# Patient Record
Sex: FEMALE | Race: WHITE | Employment: OTHER | URBAN - METROPOLITAN AREA
[De-identification: names, ages, dates, MRNs, and addresses within clinical notes are randomized per-mention and may not be internally consistent; named-entity substitution may affect disease eponyms.]

---

## 2018-09-30 ENCOUNTER — APPOINTMENT (OUTPATIENT)
Dept: CT IMAGING | Age: 78
End: 2018-09-30
Attending: EMERGENCY MEDICINE
Payer: MEDICARE

## 2018-09-30 ENCOUNTER — HOSPITAL ENCOUNTER (EMERGENCY)
Age: 78
Discharge: HOME | End: 2018-09-30
Attending: EMERGENCY MEDICINE | Admitting: EMERGENCY MEDICINE
Payer: MEDICARE

## 2018-09-30 ENCOUNTER — APPOINTMENT (OUTPATIENT)
Dept: GENERAL RADIOLOGY | Age: 78
End: 2018-09-30
Attending: EMERGENCY MEDICINE
Payer: MEDICARE

## 2018-09-30 VITALS
DIASTOLIC BLOOD PRESSURE: 71 MMHG | BODY MASS INDEX: 23.04 KG/M2 | RESPIRATION RATE: 15 BRPM | HEIGHT: 63 IN | TEMPERATURE: 99.6 F | SYSTOLIC BLOOD PRESSURE: 120 MMHG | HEART RATE: 83 BPM | OXYGEN SATURATION: 99 % | WEIGHT: 130 LBS

## 2018-09-30 DIAGNOSIS — R06.02 SOB (SHORTNESS OF BREATH): Primary | ICD-10-CM

## 2018-09-30 DIAGNOSIS — I44.7 LBBB (LEFT BUNDLE BRANCH BLOCK): ICD-10-CM

## 2018-09-30 DIAGNOSIS — K44.9 HIATAL HERNIA: ICD-10-CM

## 2018-09-30 LAB
ALBUMIN SERPL-MCNC: 4.1 G/DL (ref 3.4–5)
ALBUMIN/GLOB SERPL: 1.1 {RATIO} (ref 0.8–1.7)
ALP SERPL-CCNC: 163 U/L (ref 45–117)
ALT SERPL-CCNC: 58 U/L (ref 13–56)
ANION GAP SERPL CALC-SCNC: 12 MMOL/L (ref 3–18)
APPEARANCE UR: CLEAR
AST SERPL-CCNC: 40 U/L (ref 15–37)
BACTERIA URNS QL MICRO: ABNORMAL /HPF
BASOPHILS # BLD: 0 K/UL (ref 0–0.1)
BASOPHILS NFR BLD: 0 % (ref 0–2)
BILIRUB SERPL-MCNC: 0.4 MG/DL (ref 0.2–1)
BILIRUB UR QL: NEGATIVE
BUN SERPL-MCNC: 22 MG/DL (ref 7–18)
BUN/CREAT SERPL: 20 (ref 12–20)
CALCIUM SERPL-MCNC: 9.8 MG/DL (ref 8.5–10.1)
CHLORIDE SERPL-SCNC: 97 MMOL/L (ref 100–108)
CK MB CFR SERPL CALC: 0.9 % (ref 0–4)
CK MB SERPL-MCNC: 1.3 NG/ML (ref 5–25)
CK SERPL-CCNC: 139 U/L (ref 26–192)
CO2 SERPL-SCNC: 26 MMOL/L (ref 21–32)
COLOR UR: YELLOW
CREAT SERPL-MCNC: 1.09 MG/DL (ref 0.6–1.3)
D DIMER PPP FEU-MCNC: 1.51 UG/ML(FEU)
DIFFERENTIAL METHOD BLD: ABNORMAL
EOSINOPHIL # BLD: 0.1 K/UL (ref 0–0.4)
EOSINOPHIL NFR BLD: 1 % (ref 0–5)
EPITH CASTS URNS QL MICRO: ABNORMAL /LPF (ref 0–5)
ERYTHROCYTE [DISTWIDTH] IN BLOOD BY AUTOMATED COUNT: 16.7 % (ref 11.6–14.5)
GLOBULIN SER CALC-MCNC: 3.7 G/DL (ref 2–4)
GLUCOSE SERPL-MCNC: 112 MG/DL (ref 74–99)
GLUCOSE UR STRIP.AUTO-MCNC: NEGATIVE MG/DL
HCT VFR BLD AUTO: 40.8 % (ref 35–45)
HGB BLD-MCNC: 13.4 G/DL (ref 12–16)
HGB UR QL STRIP: NEGATIVE
HYALINE CASTS URNS QL MICRO: ABNORMAL /LPF (ref 0–2)
KETONES UR QL STRIP.AUTO: 15 MG/DL
LEUKOCYTE ESTERASE UR QL STRIP.AUTO: ABNORMAL
LYMPHOCYTES # BLD: 1.1 K/UL (ref 0.9–3.6)
LYMPHOCYTES NFR BLD: 11 % (ref 21–52)
MAGNESIUM SERPL-MCNC: 2.2 MG/DL (ref 1.6–2.6)
MCH RBC QN AUTO: 27.1 PG (ref 24–34)
MCHC RBC AUTO-ENTMCNC: 32.8 G/DL (ref 31–37)
MCV RBC AUTO: 82.6 FL (ref 74–97)
MONOCYTES # BLD: 1.4 K/UL (ref 0.05–1.2)
MONOCYTES NFR BLD: 14 % (ref 3–10)
MUCOUS THREADS URNS QL MICRO: ABNORMAL /LPF
NEUTS SEG # BLD: 7.3 K/UL (ref 1.8–8)
NEUTS SEG NFR BLD: 74 % (ref 40–73)
NITRITE UR QL STRIP.AUTO: NEGATIVE
PH UR STRIP: 6.5 [PH] (ref 5–8)
PLATELET # BLD AUTO: 360 K/UL (ref 135–420)
PMV BLD AUTO: 10.1 FL (ref 9.2–11.8)
POTASSIUM SERPL-SCNC: 3.8 MMOL/L (ref 3.5–5.5)
PROT SERPL-MCNC: 7.8 G/DL (ref 6.4–8.2)
PROT UR STRIP-MCNC: 30 MG/DL
RBC # BLD AUTO: 4.94 M/UL (ref 4.2–5.3)
RBC #/AREA URNS HPF: NEGATIVE /HPF (ref 0–5)
SODIUM SERPL-SCNC: 135 MMOL/L (ref 136–145)
SP GR UR REFRACTOMETRY: 1.02 (ref 1–1.03)
TROPONIN I SERPL-MCNC: <0.02 NG/ML (ref 0–0.06)
UROBILINOGEN UR QL STRIP.AUTO: 1 EU/DL (ref 0.2–1)
WBC # BLD AUTO: 9.9 K/UL (ref 4.6–13.2)
WBC URNS QL MICRO: ABNORMAL /HPF (ref 0–5)

## 2018-09-30 PROCEDURE — 85379 FIBRIN DEGRADATION QUANT: CPT | Performed by: EMERGENCY MEDICINE

## 2018-09-30 PROCEDURE — 96361 HYDRATE IV INFUSION ADD-ON: CPT

## 2018-09-30 PROCEDURE — 74177 CT ABD & PELVIS W/CONTRAST: CPT

## 2018-09-30 PROCEDURE — 82550 ASSAY OF CK (CPK): CPT | Performed by: EMERGENCY MEDICINE

## 2018-09-30 PROCEDURE — 93005 ELECTROCARDIOGRAM TRACING: CPT

## 2018-09-30 PROCEDURE — 81001 URINALYSIS AUTO W/SCOPE: CPT | Performed by: EMERGENCY MEDICINE

## 2018-09-30 PROCEDURE — 71046 X-RAY EXAM CHEST 2 VIEWS: CPT

## 2018-09-30 PROCEDURE — 85025 COMPLETE CBC W/AUTO DIFF WBC: CPT | Performed by: EMERGENCY MEDICINE

## 2018-09-30 PROCEDURE — 99285 EMERGENCY DEPT VISIT HI MDM: CPT

## 2018-09-30 PROCEDURE — 71275 CT ANGIOGRAPHY CHEST: CPT

## 2018-09-30 PROCEDURE — 80053 COMPREHEN METABOLIC PANEL: CPT | Performed by: EMERGENCY MEDICINE

## 2018-09-30 PROCEDURE — 96360 HYDRATION IV INFUSION INIT: CPT

## 2018-09-30 PROCEDURE — 83735 ASSAY OF MAGNESIUM: CPT | Performed by: EMERGENCY MEDICINE

## 2018-09-30 PROCEDURE — 74011636320 HC RX REV CODE- 636/320: Performed by: EMERGENCY MEDICINE

## 2018-09-30 PROCEDURE — 74011250636 HC RX REV CODE- 250/636: Performed by: EMERGENCY MEDICINE

## 2018-09-30 RX ORDER — ONDANSETRON 2 MG/ML
4 INJECTION INTRAMUSCULAR; INTRAVENOUS
Status: DISCONTINUED | OUTPATIENT
Start: 2018-09-30 | End: 2018-10-01 | Stop reason: HOSPADM

## 2018-09-30 RX ORDER — SIMVASTATIN 20 MG/1
20 TABLET, FILM COATED ORAL
COMMUNITY

## 2018-09-30 RX ORDER — CYCLOBENZAPRINE HCL 10 MG
TABLET ORAL
COMMUNITY
End: 2018-10-24

## 2018-09-30 RX ORDER — CLOBETASOL PROPIONATE 0.5 MG/ML
1 LOTION TOPICAL 2 TIMES DAILY
COMMUNITY

## 2018-09-30 RX ORDER — DICLOFENAC SODIUM 75 MG/1
75 TABLET, DELAYED RELEASE ORAL
COMMUNITY
End: 2018-10-31

## 2018-09-30 RX ORDER — LISINOPRIL 5 MG/1
2.5 TABLET ORAL DAILY
COMMUNITY

## 2018-09-30 RX ORDER — ONDANSETRON 4 MG/1
4 TABLET, ORALLY DISINTEGRATING ORAL
Qty: 6 TAB | Refills: 0 | Status: SHIPPED | OUTPATIENT
Start: 2018-09-30

## 2018-09-30 RX ORDER — ZOLEDRONIC ACID 5 MG/100ML
5 INJECTION, SOLUTION INTRAVENOUS ONCE
COMMUNITY

## 2018-09-30 RX ADMIN — SODIUM CHLORIDE 1000 ML: 900 INJECTION, SOLUTION INTRAVENOUS at 17:22

## 2018-09-30 RX ADMIN — IOPAMIDOL 100 ML: 755 INJECTION, SOLUTION INTRAVENOUS at 19:44

## 2018-09-30 NOTE — ED PROVIDER NOTES
EMERGENCY DEPARTMENT HISTORY AND PHYSICAL EXAM    Date: 9/30/2018  Patient Name: Tilden Skiff    History of Presenting Illness     Chief Complaint   Patient presents with    Shortness of Breath    Vomiting    Nausea         History Provided By: Patient and EMS    Chief Complaint: Fatigue  Duration: 2 Days  Timing:  Acute  Location: N/A  Modifying Factors: SOB is exacerbated with exertion. Associated Symptoms: nausea, 2 episodes of vomiting, intermittent SOB, dizziness that has resolved, frequent burping, mild right-sided headache, productive clear cough when standing, abdominal pain that is resolved after burping    Additional History (Context):   4:11 PM  Tilden Skiff is a 70 y.o. female with PMHX of HTN, GERD, and arthritis who presents to the emergency department via EMS C/O fatigue onset 2 days ago. Associated sxs include nausea, 2 episodes of vomiting, intermittent SOB, dizziness that has resolved, frequent burping, mild right-sided headache, productive clear cough when standing, abdominal pain that is resolved after burping. SOB is exacerbated with exertion. Pt was seen at Urgent Care today for same and was sent to this facility after hearing an arrhythmia. Pt had an EKG performed at that time and advised the pt to come to the ED for further evaluation. Pt was seen by her PCP last week due to having back surgery this week. Pt reports she has been previously diagnosed with a LBBB. EMS administered 4 mg of Zofran and reports a blood pressure of 173/97 and O2 stats of 98% on room air. Pt denies chest pain, diarrhea, constipation, hematuria, hematochezia, and any other sxs or complaints.      PCP: JOSE Otoole    Current Facility-Administered Medications   Medication Dose Route Frequency Provider Last Rate Last Dose    ondansetron (ZOFRAN) injection 4 mg  4 mg IntraVENous NOW Paul Ferrari MD         Current Outpatient Prescriptions   Medication Sig Dispense Refill    ondansetron (ZOFRAN ODT) 4 mg disintegrating tablet Take 1 Tab by mouth every eight (8) hours as needed for Nausea. 6 Tab 0    clobetasol (CLOBEX) 0.05 % lotion Apply  to affected area two (2) times a day.  cyclobenzaprine (FLEXERIL) 10 mg tablet Take  by mouth three (3) times daily as needed for Muscle Spasm(s).  diclofenac EC (VOLTAREN) 75 mg EC tablet Take  by mouth.  doxepin HCl (SINEQUAN PO) Take 25 mg by mouth.  dupilumab (DUPIXENT) 300 mg/2 mL syrg syringe 300 mg by SubCUTAneous route.  lisinopril (PRINIVIL, ZESTRIL) 5 mg tablet Take  by mouth daily.  simvastatin (ZOCOR) 20 mg tablet Take  by mouth nightly.  zoledronic acid (RECLAST) 5 mg/100 mL pgbk 5 mg by IntraVENous route once.  Hydrochlorothiazide 12.5 mg tablet Take 12.5 mg by mouth daily.  omeprazole (PRILOSEC) 40 mg capsule Take 40 mg by mouth daily.  Magnesium Oxide 500 mg cap Take 400 mg by mouth daily.  atorvastatin (LIPITOR) 20 mg tablet Take 20 mg by mouth daily.  LORazepam (ATIVAN) 0.5 mg tablet Take 0.5 mg by mouth every eight (8) hours as needed for Anxiety.  Cholecalciferol, Vitamin D3, (VITAMIN D3) 1,000 unit cap Take  by mouth daily.  omega-3 fatty acids-vitamin e (FISH OIL) 1,000 mg cap Take 3 Caps by mouth daily.  hydrOXYzine (ATARAX) 25 mg tablet Take 25 mg by mouth three (3) times daily as needed for Itching.  triamcinolone acetonide (KENALOG) 40 mg/mL injection 40 mg by IntraMUSCular route once.  betamethasone (CELESTONE SOLUSPAN) 6 mg/mL injection 6 mg by IntraMUSCular route once.          Past History     Past Medical History:  Past Medical History:   Diagnosis Date    Acid reflux     Arthritis     Autoimmune disease ([address]     GERD (gastroesophageal reflux disease)     Hypertension 2004    Ill-defined condition     \"lichen plautus per paperwork    Lichen planus     Unspecified adverse effect of anesthesia     itching       Past Surgical History:  Past Surgical History:   Procedure Laterality Date    HX HEENT      sinus surgery    HX ORTHOPAEDIC      right femur fracture pin    HX ORTHOPAEDIC      bone graft right femur    HX ORTHOPAEDIC      right femur pin removal    HX ORTHOPAEDIC      maura bunnion feet surgery    HX ORTHOPAEDIC      right hip replacement    HX ORTHOPAEDIC      right knee replacement    HX ORTHOPAEDIC      carpal tunnel left    HX ORTHOPAEDIC      cervical surgery       Family History:  History reviewed. No pertinent family history. Social History:  Social History   Substance Use Topics    Smoking status: Never Smoker    Smokeless tobacco: Never Used    Alcohol use No       Allergies: Allergies   Allergen Reactions    Other Food Itching     Sugar makes her itch     Benadrilina [Diphenhydramine Hcl] Other (comments)     \"climbs the wall\"    Phenergan [Promethazine] Other (comments)     \"climbs the wall\"    Rice Itching     Itching and stuffed up          Review of Systems   Review of Systems   Constitutional: Positive for fatigue. Respiratory: Positive for cough (productive clear, when standing) and shortness of breath (intermittent). Cardiovascular: Negative for chest pain. Gastrointestinal: Positive for abdominal pain (resolved after burping), nausea and vomiting (2 episodes). Negative for blood in stool, constipation and diarrhea. (+) Frequent burping   Genitourinary: Negative for hematuria. Neurological: Positive for dizziness (has resolved) and headaches (mild right-sided). All other systems reviewed and are negative. Physical Exam     Vitals:    09/30/18 1715 09/30/18 1802 09/30/18 1845 09/30/18 2017   BP: 116/61 119/57 107/51 110/54   Pulse: 93 83 72 88   Resp: 8 13 15 15   Temp:    99.6 °F (37.6 °C)   SpO2: 100% 99% 98% 100%   Weight:       Height:         Physical Exam   Nursing note and vitals reviewed. Constitutional: Alert.  Well appearing, no acute distress  Head: Normocephalic, Atraumatic  Eyes: Pupils are equal, round, and reactive to light, EOMI  ENT: Moist mucous membranes, oropharynx clear. Neck: Supple, non-tender  Cardiovascular: Regular rate and rhythm, no murmurs, rubs, or gallops  Chest: Normal work of breathing and chest excursion bilaterally. No reproducible chest tenderness. Lungs: Clear to ausculation bilaterally. Abdomen: Soft, non tender, non distended  Back: No evidence of trauma or deformity. No CVA Tenderness.   Extremities: No evidence of trauma or deformity, no LE edema  Skin: Warm and dry  Neuro: Alert and appropriate, facial movement symmetric, normal speech, strength and sensation full and symmetric bilaterally, normal gait, normal coordination  Psychiatric: Normal mood and affect    Diagnostic Study Results     Labs -     Recent Results (from the past 12 hour(s))   URINALYSIS W/ RFLX MICROSCOPIC    Collection Time: 09/30/18  4:30 PM   Result Value Ref Range    Color YELLOW      Appearance CLEAR      Specific gravity 1.019 1.005 - 1.030      pH (UA) 6.5 5.0 - 8.0      Protein 30 (A) NEG mg/dL    Glucose NEGATIVE  NEG mg/dL    Ketone 15 (A) NEG mg/dL    Bilirubin NEGATIVE  NEG      Blood NEGATIVE  NEG      Urobilinogen 1.0 0.2 - 1.0 EU/dL    Nitrites NEGATIVE  NEG      Leukocyte Esterase MODERATE (A) NEG     URINE MICROSCOPIC ONLY    Collection Time: 09/30/18  4:30 PM   Result Value Ref Range    WBC 10 to 15 0 - 5 /hpf    RBC NEGATIVE  0 - 5 /hpf    Epithelial cells FEW 0 - 5 /lpf    Bacteria 1+ (A) NEG /hpf    Mucus 1+ (A) NEG /lpf    Hyaline cast 6 to 10 0 - 2 /lpf   CBC WITH AUTOMATED DIFF    Collection Time: 09/30/18  4:45 PM   Result Value Ref Range    WBC 9.9 4.6 - 13.2 K/uL    RBC 4.94 4.20 - 5.30 M/uL    HGB 13.4 12.0 - 16.0 g/dL    HCT 40.8 35.0 - 45.0 %    MCV 82.6 74.0 - 97.0 FL    MCH 27.1 24.0 - 34.0 PG    MCHC 32.8 31.0 - 37.0 g/dL    RDW 16.7 (H) 11.6 - 14.5 %    PLATELET 478 530 - 722 K/uL    MPV 10.1 9.2 - 11.8 FL    NEUTROPHILS 74 (H) 40 - 73 % LYMPHOCYTES 11 (L) 21 - 52 %    MONOCYTES 14 (H) 3 - 10 %    EOSINOPHILS 1 0 - 5 %    BASOPHILS 0 0 - 2 %    ABS. NEUTROPHILS 7.3 1.8 - 8.0 K/UL    ABS. LYMPHOCYTES 1.1 0.9 - 3.6 K/UL    ABS. MONOCYTES 1.4 (H) 0.05 - 1.2 K/UL    ABS. EOSINOPHILS 0.1 0.0 - 0.4 K/UL    ABS. BASOPHILS 0.0 0.0 - 0.1 K/UL    DF AUTOMATED     EKG, 12 LEAD, INITIAL    Collection Time: 09/30/18  4:45 PM   Result Value Ref Range    Ventricular Rate 89 BPM    Atrial Rate 89 BPM    P-R Interval 162 ms    QRS Duration 142 ms    Q-T Interval 424 ms    QTC Calculation (Bezet) 515 ms    Calculated P Axis 40 degrees    Calculated R Axis 8 degrees    Calculated T Axis 117 degrees    Diagnosis       Normal sinus rhythm  Left bundle branch block  Abnormal ECG  When compared with ECG of 28-FEB-2014 15:01,  Left bundle branch block is now present     D DIMER    Collection Time: 09/30/18  5:05 PM   Result Value Ref Range    D DIMER 1.51 (H) <0.46 ug/ml(FEU)   CARDIAC PANEL,(CK, CKMB & TROPONIN)    Collection Time: 09/30/18  5:05 PM   Result Value Ref Range     26 - 192 U/L    CK - MB 1.3 <3.6 ng/ml    CK-MB Index 0.9 0.0 - 4.0 %    Troponin-I, Qt. <0.02 0.00 - 0.06 NG/ML   MAGNESIUM    Collection Time: 09/30/18  5:05 PM   Result Value Ref Range    Magnesium 2.2 1.6 - 2.6 mg/dL   METABOLIC PANEL, COMPREHENSIVE    Collection Time: 09/30/18  5:05 PM   Result Value Ref Range    Sodium 135 (L) 136 - 145 mmol/L    Potassium 3.8 3.5 - 5.5 mmol/L    Chloride 97 (L) 100 - 108 mmol/L    CO2 26 21 - 32 mmol/L    Anion gap 12 3.0 - 18 mmol/L    Glucose 112 (H) 74 - 99 mg/dL    BUN 22 (H) 7.0 - 18 MG/DL    Creatinine 1.09 0.6 - 1.3 MG/DL    BUN/Creatinine ratio 20 12 - 20      GFR est AA 59 (L) >60 ml/min/1.73m2    GFR est non-AA 49 (L) >60 ml/min/1.73m2    Calcium 9.8 8.5 - 10.1 MG/DL    Bilirubin, total 0.4 0.2 - 1.0 MG/DL    ALT (SGPT) 58 (H) 13 - 56 U/L    AST (SGOT) 40 (H) 15 - 37 U/L    Alk.  phosphatase 163 (H) 45 - 117 U/L    Protein, total 7.8 6.4 - 8.2 g/dL    Albumin 4.1 3.4 - 5.0 g/dL    Globulin 3.7 2.0 - 4.0 g/dL    A-G Ratio 1.1 0.8 - 1.7         Radiologic Studies -   CT ABD PELV W CONT   Final Result      CTA CHEST W OR W WO CONT   Final Result      XR CHEST PA LAT   Final Result        4:46 PM  RADIOLOGY FINDINGS  Chest X-ray shows no acute process. Pending review by Radiologist  Recorded by Mignon Ocasio ED Scribe, as dictated by Joel Pollard MD    CT Results  (Last 48 hours)               09/30/18 2025  CT ABD PELV W CONT Final result    Impression:  IMPRESSION:       1. No evidence of acute solid organ or bowel abnormality. 2. No free fluid or fluid collection. 3. Simple left renal cyst. Additional bilateral probable renal cysts. Strictly   indeterminate 1 cm left renal interpolar hypodensity, perhaps cyst as well. 4. Nonspecific small right hepatic lobe hypodensity too small to characterize. 5. Hiatal hernia. Narrative:  EXAM: CT ABDOMEN AND PELVIS IV ONLY       INDICATION: Abdominal pain       COMPARISON: No prior comparison study       TECHNIQUE: Axial CT imaging of the abdomen and pelvis was performed after the   administration of only IV contrast as per specific clinician request.    Multiplanar reformats were generated. One or more dose reduction techniques   were used on this CT: automated exposure control, adjustment of the mAs and/or   kVp according to patient's size, and iterative reconstruction techniques. The   specific techniques utilized on this CT exam have been documented in the   patient's electronic medical record.       _______________       FINDINGS:       LOWER CHEST: The visualized lung bases are clear. There is no pericardial or   pleural effusion. Mild to moderate sized hiatal hernia is present.        LIVER, BILIARY: Tiny hypodensity is present along the medial aspect of the   posterior segment right hepatic lobe measuring estimated 9 x 3 x 9 mm in size,   too small accurately characterize perhaps cyst. No abnormal biliary dilation. Gallbladder is unremarkable. PANCREAS: Unremarkable. SPLEEN: Unremarkable. ADRENALS: Unremarkable. KIDNEYS: 3 cm hypodensity is present in the left renal interpolar region with   Hounsfield units suggesting simple cyst. Smaller bilateral cortical   hypodensities are present which are too small accurately characterize. 1 cm   hypodensity along the ventral right interpolar region is indeterminate based on   Hounsfield units, 30s. No hydronephrosis is present. There is a small amount of   excreted contrast in the renal collecting systems. LYMPH NODES: No enlarged lymph nodes by size criteria. GASTROINTESTINAL TRACT: The lack of oral contrast limits bowel evaluation. No   abnormal bowel dilation or wall thickening. The stomach is incompletely   distended, otherwise grossly normal. The appendix is not discretely seen. No   pericecal inflammatory changes are present. PELVIC ORGANS: Unremarkable. VASCULATURE: Atherosclerotic calcifications are present. OSSEOUS: Prominent scatter artifact is produced by orthopedic hardware from   prior right total hip arthroplasty. Degenerative changes are seen in the spine. Mild dextroconvex curvature is present with apex at the L2-L3 level. Slight   L4-L5 anterior listhesis is present probably due to facet degeneration at this   level. Irregularity is present along the right iliac wing with small amount of   absent bone, likely from prior bone harvesting although nonspecific. OTHER: None.       _______________           09/30/18 2023  CTA CHEST W OR W WO CONT Final result    Impression:  IMPRESSION:       1. No evidence of pulmonary embolism. 2.  Minimal atelectasis. No consolidation.        Narrative:  EXAM: CTA CHEST       INDICATION: Chest pain, acute, pulmonary embolism (PE) suspected       COMPARISON: CTA chest 5/9/2006       TECHNIQUE: Axial CT imaging from the thoracic inlet through the diaphragm with   intravenous contrast utilizing CTA study for pulmonary artery evaluation. Coronal and sagittal MIP reformations were generated at a separate workstation. One or more dose reduction techniques were used on this CT: automated exposure   control, adjustment of the mAs and/or kVp according to patient's size, and   iterative reconstruction techniques. The specific techniques utilized on this CT   exam have been documented in the patient's electronic medical record.       _______________       FINDINGS:       EXAM QUALITY: Overall exam quality is adequate. Pulmonary arterial enhancement   is adequate. The breath hold is satisfactory. PULMONARY ARTERIES: No convincing evidence of pulmonary embolism. MEDIASTINUM: Atherosclerotic calcifications are present in the thoracic aorta   without aneurysmal dilatation or intimal flap. No pericardial effusion is   present. PLEURA: Unremarkable. LYMPH NODES: No enlarged lymph nodes by size criteria. LUNGS/AIRWAY: Minimal subpleural reticular densities are present posteriorly in   both lungs suggesting atelectasis. No consolidation is present. UPPER ABDOMEN: Unremarkable. OSSEOUS STRUCTURES: Degenerative changes are seen in the spine. OTHER: Hiatal hernia is present.   _______________               CXR Results  (Last 48 hours)               09/30/18 1639  XR CHEST PA LAT Final result    Impression:  IMPRESSION:       1. No acute cardiopulmonary process. Narrative:  PA and Lateral Chest         History:  abdominal pain , shortness of breath        Comparison: PA chest 2/28/2014       PA and lateral views of the chest demonstrate the cardiomediastinal silhouette   is within normal limits. Cardiac monitoring leads are present. There is no focal   consolidation, pleural effusion, or pneumothorax. Degenerative changes are seen   in the spine.  Anterior cervical fusion hardware again seen at the edge of the   field-of-view. Medications given in the ED-  Medications   ondansetron [facility]) injection 4 mg (4 mg IntraVENous Refused 9/30/18 1653)   sodium chloride 0.9 % bolus infusion 1,000 mL (0 mL IntraVENous IV Completed 9/30/18 1933)   iopamidol (ISOVUE-370) 76 % injection  mL (100 mL IntraVENous Given 9/30/18 1944)         Medical Decision Making   I am the first provider for this patient. I reviewed the vital signs, available nursing notes, past medical history, past surgical history, family history and social history. Vital Signs-Reviewed the patient's vital signs. Pulse Oximetry Analysis - 99% on RA     Cardiac Monitor:  Rate: 87 bpm  Rhythm: NSR    EKG interpretation: (EMS)  Rhythm: Sinus tachycardia. Rate: 103 bpm; LBBB (is not new)  EKG read by Ellen Aguero MD at 4:20 PM     EKG interpretation: (Preliminary)  Rhythm: NSR. Rate: 89 bpm; QTc: 515 ms, LBBB. No acute signs of ischemia. EKG read by Ellen Aguero MD at 4:49 PM     Records Reviewed: Nursing Notes and Old Medical Records    Provider Notes (Medical Decision Making): 70 y.o female presenting with vague complaints. She is complaining of intermittent nausea and fatigue. She has vomited twice. She also complains of intermittent shortness of breath but does not feel short of breath now. She was sent here by Urgent Care. Her only complaint currently is nausea and fatigue. She denies chest pain though she has been having frequent episode of belching. At this time concern for cardiac etiology, will get an EKG and cardiac enzymes as well as a CXR. Will treat nausea with Zofran and check basic labs to look for cause. Given that she has not abdominal pain, will not order a CT unless there is something concerning in her labs. Procedures:  Procedures    ED Course:   4:11 PM Initial assessment performed.  The patients presenting problems have been discussed, and they are in agreement with the care plan formulated and outlined with them. I have encouraged them to ask questions as they arise throughout their visit. 4:56 PM Pt declines any nausea medication at this time. 5:55 PM When pt was doing her walking sat, her oxygen went down to 95% and her heart rate increased 115 bpm. D dimer was elevated to 1.51. At this time, concern that her symptoms could be related to a PE. Will obtain a CTA chest. Given her nausea and vomiting, will also do a CT abdomen and pelvis. SIGN OUT:  7:00 PM  Patient's presentation, labs/imaging and plan of care was reviewed with Serge Harper MD as part of sign out. They will await CT results as part of the plan discussed with the patient. Serge Harper MD's assistance in completion of this plan is greatly appreciated but it should be noted that I will be the provider of record for this patient. Mary Walton MD    9:21 PM Assumed care from Mary Walton MD, elderly female presented with some SOB, which is worse with exertion. CT chest abd was pending, patient without complaints on exam, reviewed labs since CT scan, which does not show PE, and the CT abd only shows hiatal hernia, has left BBB, age unknown, last EKG in the ED was 2014, which only had nonspecific ST changes. Abd pain most likely from the hiatal hernia and will refer to cardiology for possible stress test for the dyspnea on exertion and the left BBB.    9:36 PM At discharge pt states that she had an EKG at [facility] for pre op of shoulder surgery last November and states that EKG was normal. She has no CP in ED, she is scheduled for back surgery this Thursday and I suggested that she see Dr. Ryan Ceja soon, possibly tomorrow as she may need a stress test prior to surgery. Diagnosis and Disposition       DISCHARGE NOTE:  9:22 PM  Leslie Dempsey's  results have been reviewed with her. She has been counseled regarding her diagnosis, treatment, and plan.   She verbally conveys understanding and agreement of the signs, symptoms, diagnosis, treatment and prognosis and additionally agrees to follow up as discussed. She also agrees with the care-plan and conveys that all of her questions have been answered. I have also provided discharge instructions for her that include: educational information regarding their diagnosis and treatment, and list of reasons why they would want to return to the ED prior to their follow-up appointment, should her condition change. She has been provided with education for proper emergency department utilization. CLINICAL IMPRESSION:    1. SOB (shortness of breath)    2. LBBB (left bundle branch block)    3. Hiatal hernia        PLAN:  1. D/C Home  2. Current Discharge Medication List      START taking these medications    Details   ondansetron (ZOFRAN ODT) 4 mg disintegrating tablet Take 1 Tab by mouth every eight (8) hours as needed for Nausea. Qty: 6 Tab, Refills: 0           3. Follow-up Information     Follow up With Details Comments Contact Brent Collins MD  For follow up with cardiology [address] [facility]  [address] [address]  [facility] [address] [facility] [address]  [phone]      [facility] EMERGENCY DEPT  As needed, if symptoms worsen [address] Carolyn Suh [id number] [phone]        _______________________________    Attestations: This note is prepared by Charissa Godinez, acting as Scribe for Weston Cabrera MD.    Weston Cabrera MD:  The scribe's documentation has been prepared under my direction and personally reviewed by me in its entirety. I confirm that the note above accurately reflects all work, treatment, procedures, and medical decision making performed by me.     This note is prepared by Tahmina Alvarez, acting as Scribe for Jose Shepard MD.    Jose Shepard MD:  The scribe's documentation has been prepared under my direction and personally reviewed by me in its entirety.   I confirm that the note above accurately reflects all work, treatment, procedures, and medical decision making performed by me.  _______________________________

## 2018-09-30 NOTE — ED NOTES
Ambu patient with continuous pulse ox up and down and back to room. Pulse ox notes patient's O2 sats 95-97% RA. It is to be noted patient with cold-to-touch fingers at this time. HR is noted 110-120s bpm. Highest  bpm. Patient reports SOB upon deep breaths and dizziness during ambulation. No breaks needed during ambulation. Steady gait noted. Valentina Ramon MD observed and made aware.

## 2018-09-30 NOTE — ED TRIAGE NOTES
Pt arrived via EMS. Pt states that she has had increased shortness of breath ain the last week, pt also c/o dizziness and nausea. Pt was brought from [facility].

## 2018-09-30 NOTE — ED NOTES
Assumed care of pt from [address]. Have been informed that pt awaits for CTA of chest/CT of abd- D Dimer came back elevated.

## 2018-10-01 NOTE — DISCHARGE INSTRUCTIONS
Hiatal Hernia: Care Instructions  Your Care Instructions  A hiatal hernia occurs when part of the stomach bulges into the chest cavity. A hiatal hernia may allow stomach acid and juices to back up into the esophagus (acid reflux). This can cause a feeling of burning, warmth, heat, or pain behind the breastbone. This feeling may often occur after you eat, soon after you lie down, or when you bend forward, and it may come and go. You also may have a sour taste in your mouth. These symptoms are commonly known as heartburn or reflux. But not all hiatal hernias cause symptoms. Follow-up care is a key part of your treatment and safety. Be sure to make and go to all appointments, and call your doctor if you are having problems. It's also a good idea to know your test results and keep a list of the medicines you take. How can you care for yourself at home? · Take your medicines exactly as prescribed. Call your doctor if you think you are having a problem with your medicine. · Do not take aspirin or other nonsteroidal anti-inflammatory drugs (NSAIDs), such as ibuprofen (Advil, Motrin) or naproxen (Aleve), unless your doctor says it is okay. Ask your doctor what you can take for pain. · Your doctor may recommend over-the-counter medicine. For mild or occasional indigestion, antacids such as Tums, Gaviscon, Maalox, or Mylanta may help. Your doctor also may recommend over-the-counter acid reducers, such as famotidine (Pepcid AC), cimetidine (Tagamet HB), ranitidine (Zantac 75 and Zantac 150), or omeprazole (Prilosec). Read and follow all instructions on the label. If you use these medicines often, talk with your doctor. · Change your eating habits. ¨ It's best to eat several small meals instead of two or three large meals. ¨ After you eat, wait 2 to 3 hours before you lie down. Late-night snacks aren't a good idea. ¨ Chocolate, mint, and alcohol can make heartburn worse.  They relax the valve between the esophagus and the stomach. ¨ Spicy foods, foods that have a lot of acid (like tomatoes and oranges), and coffee can make heartburn symptoms worse in some people. If your symptoms are worse after you eat a certain food, you may want to stop eating that food to see if your symptoms get better. · Do not smoke or chew tobacco.  · If you get heartburn at night, raise the head of your bed 6 to 8 inches by putting the frame on blocks or placing a foam wedge under the head of your mattress. (Adding extra pillows does not work.)  · Do not wear tight clothing around your middle. · Lose weight if you need to. Losing just 5 to 10 pounds can help. When should you call for help? Call your doctor now or seek immediate medical care if:    · You have new or worse belly pain.     · You are vomiting.    Watch closely for changes in your health, and be sure to contact your doctor if:    · You have new or worse symptoms of indigestion.     · You have trouble or pain swallowing.     · You are losing weight.     · You do not get better as expected. Where can you learn more? Go to http://victor manuel-abby.info/. Enter P857 in the search box to learn more about \"Hiatal Hernia: Care Instructions. \"  Current as of: May 12, 2017  Content Version: 11.7  © 4193-2489 [employer]. Care instructions adapted under license by [employer] (which disclaims liability or warranty for this information). If you have questions about a medical condition or this instruction, always ask your healthcare professional. [place] [address] any warranty or liability for your use of this information.

## 2018-10-01 NOTE — ED NOTES
VSS. Pt mentioning her visit at [facility] and why she is here. Pt hoping test go well, for she isn't wanting to reschedule her back surgery. Pt awaiting CT scan results. No needs/or complaints being voiced by pt. NAD observed.

## 2018-10-17 PROBLEM — M48.062 SPINAL STENOSIS OF LUMBAR REGION WITH NEUROGENIC CLAUDICATION: Status: ACTIVE | Noted: 2018-10-17

## 2018-10-17 PROBLEM — M51.26 HNP (HERNIATED NUCLEUS PULPOSUS), LUMBAR: Status: ACTIVE | Noted: 2018-10-17

## 2018-10-17 PROBLEM — M51.36 DDD (DEGENERATIVE DISC DISEASE), LUMBAR: Status: ACTIVE | Noted: 2018-10-17

## 2018-10-24 ENCOUNTER — HOSPITAL ENCOUNTER (OUTPATIENT)
Dept: PREADMISSION TESTING | Age: 78
Discharge: HOME | End: 2018-10-24
Payer: MEDICARE

## 2018-10-24 VITALS — WEIGHT: 134 LBS | HEIGHT: 62 IN | BODY MASS INDEX: 24.66 KG/M2

## 2018-10-24 PROCEDURE — 87641 MR-STAPH DNA AMP PROBE: CPT | Performed by: ORTHOPAEDIC SURGERY

## 2018-10-24 RX ORDER — METOPROLOL SUCCINATE 25 MG/1
25 TABLET, EXTENDED RELEASE ORAL
COMMUNITY

## 2018-10-24 NOTE — PERIOP NOTES
Pt. Denies personal or family history of problems with anesthesia. Pt is independent. Denies sleep apnea. No CPAP. Cardiac clearance under media tab.

## 2018-10-25 LAB
BACTERIA SPEC CULT: NORMAL
SERVICE CMNT-IMP: NORMAL

## 2018-10-29 ENCOUNTER — ANESTHESIA EVENT (OUTPATIENT)
Dept: SURGERY | Age: 78
DRG: 854 | End: 2018-10-29
Payer: MEDICARE

## 2018-10-29 NOTE — H&P
Patient Name:  Ye Moore  YOB: 1940      Chief Complaint:  Lower back pain and bilateral lower extremity pain. History of Chief Complaint:  Ms. Renuka Klein is a 70-year-old female referred by Darion Nuñez PA-C, who is being seen for lower back pain and bilateral lower extremity pain. The pain is worse in the thighs, and she has severe leg cramps at night. She was scheduled for lumbar surgery with Dr. Nela Olivas, but it was canceled due to her having shortness of breath and dizziness. She was seen at urgent care and had an EKG and now has an upcoming nuclear stress test and echocardiogram. She is awaiting cardiac clearance. Dr. Nela Olivas now has no availability until December, and she was referred to see me by seven people. She has a followup with her cardiologist, Dr. Eulalio Saini, on 10/08/18. Her pain is constant, and she rates it as a 4/10. She has occasional numbness in the left lower extremity. There is no weakness and no bowel or bladder dysfunction. She has problems with poor balance. The pain is worse with walking, standing, and sitting. It is better with lying down. The pain is worse in the evenings. The patient's uncle is a chiropractor, and she has had multiple sessions with him throughout her life. She has had four sessions of physical therapy in September 2018 with no relief. She had an epidural steroid injection done in April 2018 which gave her no relief.  She had an MRI scan done at [facility], and she had a DEXA scan done in 2016 which showed osteopenia, and she has been on Reclast.     Past Medical/Surgical History:    Disease/Disorder Type Date Side Surgery Date Side Comment   Lichen planus          Acid reflux          Hypertension          Elevated lipids          Arthritis              ORIF 1964 right [facility] 10/09/2018 -femur       Pins removed from femur Martha 110 bilateral        Hip replacement 1996 right        Knee replacement 2004 right        Carpal tunnel release 2008 left        Surgery, cervical spine 2008         Sinus surgery 2010         Hip replacement 2014 right        Shoulder surgery 2017       Allergies:    Ingredient Reaction Medication Name Comment   DIPHENHYDRAMINE HCL  Benadryl    PROMETHAZINE HCL  Phenergan      Current Medications:    Medication Directions   Nexium 20 mg capsule,delayed release take 1 capsule by oral route  every day at least 1 hour before a meal swallowing whole. Do not crush or chew granules. lisinopril 2.5 mg tablet take 1 tablet by oral route  every day   hydrochlorothiazide 12.5 mg tablet take 1 tablet by oral route  every day   magnesium 200 mg tablet take 2 tablet by oral route  every day   Zocor 20 mg tablet take 1 tablet by oral route  every day in the evening   diclofenac sodium 75 mg tablet,delayed release take 1 tablet by oral route 2 times every day   Ativan 1 mg tablet take 1 tablet by oral route  every day as needed   Dupixent 300 mg/2 mL subcutaneous syringe inject 2 milliliter by subcutaneous route  every 2 weeks in the abdomen, thigh, or upper arm rotating injection sites     Social History:      SMOKING  Status Tobacco Type Units Per Day Yrs Used   Never smoker        ALCOHOL  There is no history of alcohol use. Family History:    Disease Detail Family Member Age Cause of Death Comments   Cardiovascular disease Father  N    Hypertension Father  N    Arthritis Mother  N    Hypertension Mother  N    Diabetes mellitus Sister  N      Review of Systems:    Pertinent positives include heart murmur, ringing in ears, gas/bloating, indigestion, joint pain, joint stiffness, loss of balance and rash/itching.   Pertinent negatives include blurred vision, chest pain, chills, cold, discharge of the eyes, dizziness, double vision, fever, headache, hearing loss, itching of the eyes, palpitations, redness of the eyes, rheumatic fever, sore throat/hoarseness, weight change, abdominal pain, anxiety, bipolar disorder, bladder/kidney infection, bloody stool, blood in urine, burning sensation, changes in mood, chronic cough, depression, diarrhea, difficulty breathing, difficulty swallowing, fainting, frequent urinating, fracture/dislocation, gout, hemorrhoids, incontinence, memory loss, muscle weakness, nausea/vomiting, numbness/tingling, pain on breathing, painful urination, psoriasis, Raynaud's phenomenon, rheumatoid disease, seizure disorder, shortness of breath, sprain/strain, swelling of feet, tendonitis, varicose veins and wheezing. Vitals:  Date BP Pulse Temp (F) Resp. (per min.) Height (Total in.) Weight (lbs.) BMI   10/05/2018     63.00 129.00 22.85     Physical Examination:    General:  The patient appears healthy. Cardiovascular:  Arterial pulses are normal.  Skin:  The skin is normal appearing with no contusions or wounds noted. Heart- RRR  Lungs-CTA maura  Abdomen- +BS,soft,nontender  Musculoskeletal:  There is tenderness around the paravertebral spinal muscles. Otherwise, the thoracolumbar spine has normal kyphosis a normal appearance. There is full range of motion of the cervical, thoracic, and lumbar spine and of the hips, knees, and ankles. Straight leg raising and crossed straight leg raising tests are negative. Neurological:  There is no weakness in the thoracic, lumbar, or sacral spine or in the lower extremities or hips. Deep tendon reflexes are present and normal bilaterally. Ankle and knee jerks are normal with no clonus. Radiograph Examination:  AP, lateral, bilateral oblique, flexion and extension views of the lumbar spine were obtained and interpreted in the office 10/5/18 and reveal 41 degrees scoliosis, apex to the right, degenerative disc disease, and spondylolisthesis of L4 on L5.     An AP view of the pelvis was obtained and interpreted in the office and reveals no periosteal reaction, no medullary lesions, no osteopenia, well-aligned joint spaces, no chondrolysis, and no fractures. Review of her MRI scan [facility] 4/3/18 reveals spinal stenosis, degenerative disc disease at L2 through S1, and spondylolisthesis at L4-5. Plan:  Ms. Nikolai Villagran and I had a long discussion about the treatments for her lower back pain, spinal stenosis, and spondylolisthesis including surgical intervention, the risks, and benefits as well as the different surgical approaches and decision making. We also discussed nonsurgical care for this condition including medications, injections, physical therapy, rehabilitation, activity modification, and brace utilization. At this point, she says her leg pain is worse than her back pain. She would like to proceed with operative intervention. We will plan for L4 to S1 decompression and fusion. I would not suggest that surgery on her back would actually help her back pain given the significant scoliosis and degenerative disc disease throughout the lumbar spine. The risks versus the benefits as well as the alternatives were fully explained to the patient. Risks include, but are not limited to, paralysis, death, heart attack, stroke, pulmonary embolism, deep vein thrombosis, infection, failure to relieve pain, increase in back or leg pain, reherniation of disc material, need for revision surgery, scarring, spinal fluid leak, bowel or bladder dysfunction, disease transmission, chronic graft site pain, instrumentation failure, pseudarthrosis, and the need for chronic ambulatory assist devices. The patient states full understanding of the risks and benefits and wishes to proceed.

## 2018-10-30 ENCOUNTER — APPOINTMENT (OUTPATIENT)
Dept: GENERAL RADIOLOGY | Age: 78
DRG: 854 | End: 2018-10-30
Attending: ORTHOPAEDIC SURGERY
Payer: MEDICARE

## 2018-10-30 ENCOUNTER — ANESTHESIA (OUTPATIENT)
Dept: SURGERY | Age: 78
DRG: 854 | End: 2018-10-30
Payer: MEDICARE

## 2018-10-30 ENCOUNTER — HOSPITAL ENCOUNTER (OUTPATIENT)
Age: 78
Discharge: HOME HEALTH | DRG: 854 | End: 2018-10-31
Attending: ORTHOPAEDIC SURGERY | Admitting: ORTHOPAEDIC SURGERY
Payer: MEDICARE

## 2018-10-30 DIAGNOSIS — M48.062 SPINAL STENOSIS OF LUMBAR REGION WITH NEUROGENIC CLAUDICATION: Primary | ICD-10-CM

## 2018-10-30 LAB
ABO + RH BLD: NORMAL
APPEARANCE UR: CLEAR
BACTERIA URNS QL MICRO: ABNORMAL /HPF
BILIRUB UR QL: NEGATIVE
BLOOD GROUP ANTIBODIES SERPL: NORMAL
COLOR UR: YELLOW
EPITH CASTS URNS QL MICRO: ABNORMAL /LPF (ref 0–5)
GLUCOSE UR STRIP.AUTO-MCNC: NEGATIVE MG/DL
HGB UR QL STRIP: NEGATIVE
KETONES UR QL STRIP.AUTO: NEGATIVE MG/DL
LEUKOCYTE ESTERASE UR QL STRIP.AUTO: ABNORMAL
MUCOUS THREADS URNS QL MICRO: ABNORMAL /LPF
NITRITE UR QL STRIP.AUTO: NEGATIVE
PH UR STRIP: 6 [PH] (ref 5–8)
PROT UR STRIP-MCNC: NEGATIVE MG/DL
RBC #/AREA URNS HPF: ABNORMAL /HPF (ref 0–5)
SP GR UR REFRACTOMETRY: 1.02 (ref 1–1.03)
SPECIMEN EXP DATE BLD: NORMAL
UROBILINOGEN UR QL STRIP.AUTO: 0.2 EU/DL (ref 0.2–1)
WBC URNS QL MICRO: ABNORMAL /HPF (ref 0–5)

## 2018-10-30 PROCEDURE — 74011000250 HC RX REV CODE- 250: Performed by: PHYSICIAN ASSISTANT

## 2018-10-30 PROCEDURE — 77030006643: Performed by: ANESTHESIOLOGY

## 2018-10-30 PROCEDURE — 77030008462 HC STPLR SKN PROX J&J -A: Performed by: ORTHOPAEDIC SURGERY

## 2018-10-30 PROCEDURE — 77030020782 HC GWN BAIR PAWS FLX 3M -B: Performed by: ORTHOPAEDIC SURGERY

## 2018-10-30 PROCEDURE — 77030008683 HC TU ET CUF COVD -A: Performed by: ANESTHESIOLOGY

## 2018-10-30 PROCEDURE — 77030018836 HC SOL IRR NACL ICUM -A: Performed by: ORTHOPAEDIC SURGERY

## 2018-10-30 PROCEDURE — 74011250636 HC RX REV CODE- 250/636: Performed by: ANESTHESIOLOGY

## 2018-10-30 PROCEDURE — 74011250636 HC RX REV CODE- 250/636

## 2018-10-30 PROCEDURE — C1713 ANCHOR/SCREW BN/BN,TIS/BN: HCPCS | Performed by: ORTHOPAEDIC SURGERY

## 2018-10-30 PROCEDURE — 74011000258 HC RX REV CODE- 258: Performed by: PHYSICIAN ASSISTANT

## 2018-10-30 PROCEDURE — 74011250637 HC RX REV CODE- 250/637: Performed by: PHYSICIAN ASSISTANT

## 2018-10-30 PROCEDURE — 74011000250 HC RX REV CODE- 250

## 2018-10-30 PROCEDURE — 76060000036 HC ANESTHESIA 2.5 TO 3 HR: Performed by: ORTHOPAEDIC SURGERY

## 2018-10-30 PROCEDURE — 74011250637 HC RX REV CODE- 250/637: Performed by: ANESTHESIOLOGY

## 2018-10-30 PROCEDURE — 0SG3071 FUSION OF LUMBOSACRAL JOINT WITH AUTOLOGOUS TISSUE SUBSTITUTE, POSTERIOR APPROACH, POSTERIOR COLUMN, OPEN APPROACH: ICD-10-PCS | Performed by: ORTHOPAEDIC SURGERY

## 2018-10-30 PROCEDURE — 77030020262 HC SOL INJ SOD CL 0.9% 100ML: Performed by: ORTHOPAEDIC SURGERY

## 2018-10-30 PROCEDURE — 77030013079 HC BLNKT BAIR HGGR 3M -A: Performed by: ANESTHESIOLOGY

## 2018-10-30 PROCEDURE — 74011250636 HC RX REV CODE- 250/636: Performed by: ORTHOPAEDIC SURGERY

## 2018-10-30 PROCEDURE — 97530 THERAPEUTIC ACTIVITIES: CPT

## 2018-10-30 PROCEDURE — 76010000132 HC OR TIME 2.5 TO 3 HR: Performed by: ORTHOPAEDIC SURGERY

## 2018-10-30 PROCEDURE — 74011000250 HC RX REV CODE- 250: Performed by: ORTHOPAEDIC SURGERY

## 2018-10-30 PROCEDURE — 77030013708 HC HNDPC SUC IRR PULS STRY –B: Performed by: ORTHOPAEDIC SURGERY

## 2018-10-30 PROCEDURE — 77030004402 HC BUR NEUR STRY -C: Performed by: ORTHOPAEDIC SURGERY

## 2018-10-30 PROCEDURE — 36415 COLL VENOUS BLD VENIPUNCTURE: CPT | Performed by: ANESTHESIOLOGY

## 2018-10-30 PROCEDURE — 74011250636 HC RX REV CODE- 250/636: Performed by: PHYSICIAN ASSISTANT

## 2018-10-30 PROCEDURE — 77030003029 HC SUT VCRL J&J -B: Performed by: ORTHOPAEDIC SURGERY

## 2018-10-30 PROCEDURE — 97162 PT EVAL MOD COMPLEX 30 MIN: CPT

## 2018-10-30 PROCEDURE — 77030008477 HC STYL SATN SLP COVD -A: Performed by: ANESTHESIOLOGY

## 2018-10-30 PROCEDURE — 81001 URINALYSIS AUTO W/SCOPE: CPT | Performed by: ORTHOPAEDIC SURGERY

## 2018-10-30 PROCEDURE — 77030032490 HC SLV COMPR SCD KNE COVD -B: Performed by: ORTHOPAEDIC SURGERY

## 2018-10-30 PROCEDURE — 86900 BLOOD TYPING SEROLOGIC ABO: CPT | Performed by: ANESTHESIOLOGY

## 2018-10-30 PROCEDURE — 76210000000 HC OR PH I REC 2 TO 2.5 HR: Performed by: ORTHOPAEDIC SURGERY

## 2018-10-30 PROCEDURE — 77030034849: Performed by: ORTHOPAEDIC SURGERY

## 2018-10-30 PROCEDURE — 77030012406 HC DRN WND PENRS BARD -A: Performed by: ORTHOPAEDIC SURGERY

## 2018-10-30 PROCEDURE — 77030003028 HC SUT VCRL J&J -A: Performed by: ORTHOPAEDIC SURGERY

## 2018-10-30 DEVICE — SCREW SPNL L50MM OD7MM SLD GEN 3 PEDFUSE RESET: Type: IMPLANTABLE DEVICE | Site: SPINE THORACIC | Status: FUNCTIONAL

## 2018-10-30 DEVICE — ROD SPNL 5.5X60MM LORDTC PEDFUSE: Type: IMPLANTABLE DEVICE | Site: SPINE THORACIC | Status: FUNCTIONAL

## 2018-10-30 DEVICE — SET SCR SPNL L5-7MM PEDFUSE: Type: IMPLANTABLE DEVICE | Site: SPINE THORACIC | Status: FUNCTIONAL

## 2018-10-30 DEVICE — SCREW SPNL L45MM OD7MM SLD GEN 3 PEDFUSE RESET: Type: IMPLANTABLE DEVICE | Site: SPINE THORACIC | Status: FUNCTIONAL

## 2018-10-30 DEVICE — SCREW SPNL L40MM OD7MM SLD GEN 3 PEDFUSE RESET: Type: IMPLANTABLE DEVICE | Site: SPINE THORACIC | Status: FUNCTIONAL

## 2018-10-30 DEVICE — IMPLANTABLE DEVICE: Type: IMPLANTABLE DEVICE | Site: SPINE THORACIC | Status: FUNCTIONAL

## 2018-10-30 RX ORDER — FLUMAZENIL 0.1 MG/ML
0.2 INJECTION INTRAVENOUS
Status: DISCONTINUED | OUTPATIENT
Start: 2018-10-30 | End: 2018-10-30 | Stop reason: HOSPADM

## 2018-10-30 RX ORDER — SODIUM CHLORIDE, SODIUM LACTATE, POTASSIUM CHLORIDE, CALCIUM CHLORIDE 600; 310; 30; 20 MG/100ML; MG/100ML; MG/100ML; MG/100ML
125 INJECTION, SOLUTION INTRAVENOUS CONTINUOUS
Status: DISCONTINUED | OUTPATIENT
Start: 2018-10-30 | End: 2018-10-31 | Stop reason: HOSPADM

## 2018-10-30 RX ORDER — SIMVASTATIN 10 MG/1
10 TABLET, FILM COATED ORAL
Status: DISCONTINUED | OUTPATIENT
Start: 2018-10-30 | End: 2018-10-31 | Stop reason: HOSPADM

## 2018-10-30 RX ORDER — DIAZEPAM 5 MG/1
2.5 TABLET ORAL
Status: DISCONTINUED | OUTPATIENT
Start: 2018-10-30 | End: 2018-10-31 | Stop reason: HOSPADM

## 2018-10-30 RX ORDER — SODIUM CHLORIDE 0.9 % (FLUSH) 0.9 %
5-10 SYRINGE (ML) INJECTION AS NEEDED
Status: DISCONTINUED | OUTPATIENT
Start: 2018-10-30 | End: 2018-10-30 | Stop reason: HOSPADM

## 2018-10-30 RX ORDER — OXYCODONE HYDROCHLORIDE 5 MG/1
5 TABLET ORAL ONCE
Status: DISCONTINUED | OUTPATIENT
Start: 2018-10-30 | End: 2018-10-30 | Stop reason: HOSPADM

## 2018-10-30 RX ORDER — ASPIRIN 81 MG/1
81 TABLET ORAL DAILY
COMMUNITY
End: 2018-10-31

## 2018-10-30 RX ORDER — DEXTROSE 50 % IN WATER (D50W) INTRAVENOUS SYRINGE
25-50 AS NEEDED
Status: DISCONTINUED | OUTPATIENT
Start: 2018-10-30 | End: 2018-10-30 | Stop reason: HOSPADM

## 2018-10-30 RX ORDER — PREGABALIN 75 MG/1
75 CAPSULE ORAL ONCE
Status: COMPLETED | OUTPATIENT
Start: 2018-10-30 | End: 2018-10-30

## 2018-10-30 RX ORDER — ACETAMINOPHEN 325 MG/1
650 TABLET ORAL
Status: DISCONTINUED | OUTPATIENT
Start: 2018-10-30 | End: 2018-10-31 | Stop reason: HOSPADM

## 2018-10-30 RX ORDER — MAGNESIUM SULFATE 100 %
4 CRYSTALS MISCELLANEOUS AS NEEDED
Status: DISCONTINUED | OUTPATIENT
Start: 2018-10-30 | End: 2018-10-30 | Stop reason: HOSPADM

## 2018-10-30 RX ORDER — KETAMINE HYDROCHLORIDE 10 MG/ML
INJECTION, SOLUTION INTRAMUSCULAR; INTRAVENOUS AS NEEDED
Status: DISCONTINUED | OUTPATIENT
Start: 2018-10-30 | End: 2018-10-30 | Stop reason: HOSPADM

## 2018-10-30 RX ORDER — SODIUM CHLORIDE 0.9 % (FLUSH) 0.9 %
5-10 SYRINGE (ML) INJECTION AS NEEDED
Status: DISCONTINUED | OUTPATIENT
Start: 2018-10-30 | End: 2018-10-31 | Stop reason: HOSPADM

## 2018-10-30 RX ORDER — NALOXONE HYDROCHLORIDE 0.4 MG/ML
0.1 INJECTION, SOLUTION INTRAMUSCULAR; INTRAVENOUS; SUBCUTANEOUS AS NEEDED
Status: DISCONTINUED | OUTPATIENT
Start: 2018-10-30 | End: 2018-10-31 | Stop reason: HOSPADM

## 2018-10-30 RX ORDER — DOCUSATE SODIUM 100 MG/1
100 CAPSULE, LIQUID FILLED ORAL 2 TIMES DAILY
Status: DISCONTINUED | OUTPATIENT
Start: 2018-10-30 | End: 2018-10-31 | Stop reason: HOSPADM

## 2018-10-30 RX ORDER — ATORVASTATIN CALCIUM 20 MG/1
20 TABLET, FILM COATED ORAL DAILY
Status: DISCONTINUED | OUTPATIENT
Start: 2018-10-31 | End: 2018-10-31 | Stop reason: HOSPADM

## 2018-10-30 RX ORDER — ALBUTEROL SULFATE 0.83 MG/ML
2.5 SOLUTION RESPIRATORY (INHALATION) AS NEEDED
Status: DISCONTINUED | OUTPATIENT
Start: 2018-10-30 | End: 2018-10-30 | Stop reason: HOSPADM

## 2018-10-30 RX ORDER — ONDANSETRON 2 MG/ML
4 INJECTION INTRAMUSCULAR; INTRAVENOUS ONCE
Status: DISCONTINUED | OUTPATIENT
Start: 2018-10-30 | End: 2018-10-30 | Stop reason: HOSPADM

## 2018-10-30 RX ORDER — ACETAMINOPHEN 500 MG
1000 TABLET ORAL ONCE
Status: COMPLETED | OUTPATIENT
Start: 2018-10-30 | End: 2018-10-30

## 2018-10-30 RX ORDER — NALOXONE HYDROCHLORIDE 0.4 MG/ML
0.2 INJECTION, SOLUTION INTRAMUSCULAR; INTRAVENOUS; SUBCUTANEOUS AS NEEDED
Status: DISCONTINUED | OUTPATIENT
Start: 2018-10-30 | End: 2018-10-30 | Stop reason: HOSPADM

## 2018-10-30 RX ORDER — ROCURONIUM BROMIDE 10 MG/ML
INJECTION, SOLUTION INTRAVENOUS AS NEEDED
Status: DISCONTINUED | OUTPATIENT
Start: 2018-10-30 | End: 2018-10-30 | Stop reason: HOSPADM

## 2018-10-30 RX ORDER — METOPROLOL SUCCINATE 25 MG/1
25 TABLET, EXTENDED RELEASE ORAL
Status: DISCONTINUED | OUTPATIENT
Start: 2018-10-30 | End: 2018-10-31 | Stop reason: HOSPADM

## 2018-10-30 RX ORDER — ONDANSETRON 2 MG/ML
4 INJECTION INTRAMUSCULAR; INTRAVENOUS ONCE
Status: COMPLETED | OUTPATIENT
Start: 2018-10-30 | End: 2018-10-30

## 2018-10-30 RX ORDER — FENTANYL CITRATE 50 UG/ML
INJECTION, SOLUTION INTRAMUSCULAR; INTRAVENOUS AS NEEDED
Status: DISCONTINUED | OUTPATIENT
Start: 2018-10-30 | End: 2018-10-30 | Stop reason: HOSPADM

## 2018-10-30 RX ORDER — LISINOPRIL 5 MG/1
2.5 TABLET ORAL DAILY
Status: DISCONTINUED | OUTPATIENT
Start: 2018-10-31 | End: 2018-10-31 | Stop reason: HOSPADM

## 2018-10-30 RX ORDER — SODIUM CHLORIDE, SODIUM LACTATE, POTASSIUM CHLORIDE, CALCIUM CHLORIDE 600; 310; 30; 20 MG/100ML; MG/100ML; MG/100ML; MG/100ML
1000 INJECTION, SOLUTION INTRAVENOUS CONTINUOUS
Status: DISCONTINUED | OUTPATIENT
Start: 2018-10-30 | End: 2018-10-30 | Stop reason: HOSPADM

## 2018-10-30 RX ORDER — INSULIN LISPRO 100 [IU]/ML
INJECTION, SOLUTION INTRAVENOUS; SUBCUTANEOUS ONCE
Status: DISCONTINUED | OUTPATIENT
Start: 2018-10-30 | End: 2018-10-30 | Stop reason: HOSPADM

## 2018-10-30 RX ORDER — CEFAZOLIN SODIUM/WATER 2 G/20 ML
2 SYRINGE (ML) INTRAVENOUS EVERY 8 HOURS
Status: COMPLETED | OUTPATIENT
Start: 2018-10-30 | End: 2018-10-31

## 2018-10-30 RX ORDER — ONDANSETRON 4 MG/1
4 TABLET, ORALLY DISINTEGRATING ORAL
Status: DISCONTINUED | OUTPATIENT
Start: 2018-10-30 | End: 2018-10-31

## 2018-10-30 RX ORDER — HYDROCHLOROTHIAZIDE 25 MG/1
12.5 TABLET ORAL DAILY
Status: DISCONTINUED | OUTPATIENT
Start: 2018-10-31 | End: 2018-10-31 | Stop reason: HOSPADM

## 2018-10-30 RX ORDER — FENTANYL CITRATE 50 UG/ML
25 INJECTION, SOLUTION INTRAMUSCULAR; INTRAVENOUS AS NEEDED
Status: DISCONTINUED | OUTPATIENT
Start: 2018-10-30 | End: 2018-10-30 | Stop reason: HOSPADM

## 2018-10-30 RX ORDER — HYDROCODONE BITARTRATE AND ACETAMINOPHEN 7.5; 325 MG/1; MG/1
1 TABLET ORAL
Status: DISCONTINUED | OUTPATIENT
Start: 2018-10-30 | End: 2018-10-31 | Stop reason: HOSPADM

## 2018-10-30 RX ORDER — LANOLIN ALCOHOL/MO/W.PET/CERES
400 CREAM (GRAM) TOPICAL DAILY
Status: DISCONTINUED | OUTPATIENT
Start: 2018-10-31 | End: 2018-10-31 | Stop reason: HOSPADM

## 2018-10-30 RX ORDER — SODIUM CHLORIDE, SODIUM LACTATE, POTASSIUM CHLORIDE, CALCIUM CHLORIDE 600; 310; 30; 20 MG/100ML; MG/100ML; MG/100ML; MG/100ML
150 INJECTION, SOLUTION INTRAVENOUS CONTINUOUS
Status: DISCONTINUED | OUTPATIENT
Start: 2018-10-30 | End: 2018-10-30 | Stop reason: HOSPADM

## 2018-10-30 RX ORDER — HYDROCODONE BITARTRATE AND ACETAMINOPHEN 5; 325 MG/1; MG/1
1 TABLET ORAL
Status: DISCONTINUED | OUTPATIENT
Start: 2018-10-30 | End: 2018-10-31 | Stop reason: HOSPADM

## 2018-10-30 RX ORDER — CEFAZOLIN SODIUM/WATER 2 G/20 ML
2 SYRINGE (ML) INTRAVENOUS ONCE
Status: COMPLETED | OUTPATIENT
Start: 2018-10-30 | End: 2018-10-30

## 2018-10-30 RX ORDER — OMEPRAZOLE 20 MG/1
40 CAPSULE, DELAYED RELEASE ORAL DAILY
Status: DISCONTINUED | OUTPATIENT
Start: 2018-10-31 | End: 2018-10-31 | Stop reason: HOSPADM

## 2018-10-30 RX ORDER — MIDAZOLAM HYDROCHLORIDE 1 MG/ML
INJECTION, SOLUTION INTRAMUSCULAR; INTRAVENOUS AS NEEDED
Status: DISCONTINUED | OUTPATIENT
Start: 2018-10-30 | End: 2018-10-30 | Stop reason: HOSPADM

## 2018-10-30 RX ORDER — CYCLOBENZAPRINE HCL 10 MG
5 TABLET ORAL
Status: DISCONTINUED | OUTPATIENT
Start: 2018-10-30 | End: 2018-10-31 | Stop reason: HOSPADM

## 2018-10-30 RX ORDER — LUBIPROSTONE 24 UG/1
24 CAPSULE, GELATIN COATED ORAL 2 TIMES DAILY WITH MEALS
Status: DISCONTINUED | OUTPATIENT
Start: 2018-10-30 | End: 2018-10-31 | Stop reason: HOSPADM

## 2018-10-30 RX ORDER — LORAZEPAM 0.5 MG/1
0.5 TABLET ORAL
Status: DISCONTINUED | OUTPATIENT
Start: 2018-10-30 | End: 2018-10-31 | Stop reason: HOSPADM

## 2018-10-30 RX ORDER — NALOXONE HYDROCHLORIDE 0.4 MG/ML
0.1 INJECTION, SOLUTION INTRAMUSCULAR; INTRAVENOUS; SUBCUTANEOUS AS NEEDED
Status: DISCONTINUED | OUTPATIENT
Start: 2018-10-30 | End: 2018-10-30 | Stop reason: HOSPADM

## 2018-10-30 RX ORDER — HYDROMORPHONE HYDROCHLORIDE 2 MG/ML
0.2 INJECTION, SOLUTION INTRAMUSCULAR; INTRAVENOUS; SUBCUTANEOUS
Status: DISCONTINUED | OUTPATIENT
Start: 2018-10-30 | End: 2018-10-30 | Stop reason: HOSPADM

## 2018-10-30 RX ORDER — ONDANSETRON 2 MG/ML
INJECTION INTRAMUSCULAR; INTRAVENOUS AS NEEDED
Status: DISCONTINUED | OUTPATIENT
Start: 2018-10-30 | End: 2018-10-30 | Stop reason: HOSPADM

## 2018-10-30 RX ORDER — PROPOFOL 10 MG/ML
INJECTION, EMULSION INTRAVENOUS AS NEEDED
Status: DISCONTINUED | OUTPATIENT
Start: 2018-10-30 | End: 2018-10-30 | Stop reason: HOSPADM

## 2018-10-30 RX ORDER — LIDOCAINE HYDROCHLORIDE 20 MG/ML
INJECTION, SOLUTION EPIDURAL; INFILTRATION; INTRACAUDAL; PERINEURAL AS NEEDED
Status: DISCONTINUED | OUTPATIENT
Start: 2018-10-30 | End: 2018-10-30 | Stop reason: HOSPADM

## 2018-10-30 RX ORDER — SODIUM CHLORIDE 0.9 % (FLUSH) 0.9 %
5-10 SYRINGE (ML) INJECTION EVERY 8 HOURS
Status: DISCONTINUED | OUTPATIENT
Start: 2018-10-30 | End: 2018-10-31 | Stop reason: HOSPADM

## 2018-10-30 RX ORDER — HYDROXYZINE 25 MG/1
25 TABLET, FILM COATED ORAL
Status: DISCONTINUED | OUTPATIENT
Start: 2018-10-30 | End: 2018-10-31 | Stop reason: HOSPADM

## 2018-10-30 RX ADMIN — ACETAMINOPHEN 1000 MG: 500 TABLET ORAL at 09:17

## 2018-10-30 RX ADMIN — FENTANYL CITRATE 25 MCG: 50 INJECTION, SOLUTION INTRAMUSCULAR; INTRAVENOUS at 09:37

## 2018-10-30 RX ADMIN — FENTANYL CITRATE 75 MCG: 50 INJECTION, SOLUTION INTRAMUSCULAR; INTRAVENOUS at 09:39

## 2018-10-30 RX ADMIN — PROPOFOL 120 MG: 10 INJECTION, EMULSION INTRAVENOUS at 09:40

## 2018-10-30 RX ADMIN — Medication 2 G: at 17:06

## 2018-10-30 RX ADMIN — METOPROLOL SUCCINATE 25 MG: 25 TABLET, EXTENDED RELEASE ORAL at 21:33

## 2018-10-30 RX ADMIN — ROCURONIUM BROMIDE 50 MG: 10 INJECTION, SOLUTION INTRAVENOUS at 09:40

## 2018-10-30 RX ADMIN — ONDANSETRON 4 MG: 2 INJECTION INTRAMUSCULAR; INTRAVENOUS at 12:12

## 2018-10-30 RX ADMIN — ONDANSETRON 4 MG: 2 INJECTION INTRAMUSCULAR; INTRAVENOUS at 13:05

## 2018-10-30 RX ADMIN — KETAMINE HYDROCHLORIDE 10 MG: 10 INJECTION, SOLUTION INTRAMUSCULAR; INTRAVENOUS at 10:40

## 2018-10-30 RX ADMIN — DOCUSATE SODIUM 100 MG: 100 CAPSULE, LIQUID FILLED ORAL at 21:33

## 2018-10-30 RX ADMIN — TRANEXAMIC ACID 1 G: 100 INJECTION, SOLUTION INTRAVENOUS at 10:12

## 2018-10-30 RX ADMIN — SODIUM CHLORIDE, SODIUM LACTATE, POTASSIUM CHLORIDE, AND CALCIUM CHLORIDE 150 ML/HR: 600; 310; 30; 20 INJECTION, SOLUTION INTRAVENOUS at 13:39

## 2018-10-30 RX ADMIN — ONDANSETRON 4 MG: 4 TABLET, ORALLY DISINTEGRATING ORAL at 17:08

## 2018-10-30 RX ADMIN — PREGABALIN 75 MG: 75 CAPSULE ORAL at 09:17

## 2018-10-30 RX ADMIN — KETAMINE HYDROCHLORIDE 10 MG: 10 INJECTION, SOLUTION INTRAMUSCULAR; INTRAVENOUS at 12:07

## 2018-10-30 RX ADMIN — FENTANYL CITRATE 25 MCG: 50 INJECTION, SOLUTION INTRAMUSCULAR; INTRAVENOUS at 11:35

## 2018-10-30 RX ADMIN — KETAMINE HYDROCHLORIDE 10 MG: 10 INJECTION, SOLUTION INTRAMUSCULAR; INTRAVENOUS at 11:56

## 2018-10-30 RX ADMIN — SODIUM CHLORIDE, SODIUM LACTATE, POTASSIUM CHLORIDE, AND CALCIUM CHLORIDE 125 ML/HR: 600; 310; 30; 20 INJECTION, SOLUTION INTRAVENOUS at 21:29

## 2018-10-30 RX ADMIN — SODIUM CHLORIDE, SODIUM LACTATE, POTASSIUM CHLORIDE, AND CALCIUM CHLORIDE 125 ML/HR: 600; 310; 30; 20 INJECTION, SOLUTION INTRAVENOUS at 16:36

## 2018-10-30 RX ADMIN — SODIUM CHLORIDE, SODIUM LACTATE, POTASSIUM CHLORIDE, AND CALCIUM CHLORIDE 125 ML/HR: 600; 310; 30; 20 INJECTION, SOLUTION INTRAVENOUS at 07:54

## 2018-10-30 RX ADMIN — SODIUM CHLORIDE, SODIUM LACTATE, POTASSIUM CHLORIDE, AND CALCIUM CHLORIDE: 600; 310; 30; 20 INJECTION, SOLUTION INTRAVENOUS at 09:47

## 2018-10-30 RX ADMIN — HYDROMORPHONE HYDROCHLORIDE: 10 INJECTION, SOLUTION INTRAMUSCULAR; INTRAVENOUS; SUBCUTANEOUS at 12:58

## 2018-10-30 RX ADMIN — Medication 2 G: at 10:09

## 2018-10-30 RX ADMIN — LIDOCAINE HYDROCHLORIDE 60 MG: 20 INJECTION, SOLUTION EPIDURAL; INFILTRATION; INTRACAUDAL; PERINEURAL at 09:40

## 2018-10-30 RX ADMIN — FENTANYL CITRATE 25 MCG: 50 INJECTION, SOLUTION INTRAMUSCULAR; INTRAVENOUS at 12:11

## 2018-10-30 RX ADMIN — ROCURONIUM BROMIDE 20 MG: 10 INJECTION, SOLUTION INTRAVENOUS at 10:23

## 2018-10-30 RX ADMIN — PROPOFOL 20 MG: 10 INJECTION, EMULSION INTRAVENOUS at 12:11

## 2018-10-30 RX ADMIN — DIAZEPAM 2.5 MG: 5 TABLET ORAL at 21:43

## 2018-10-30 RX ADMIN — FENTANYL CITRATE 25 MCG: 50 INJECTION, SOLUTION INTRAMUSCULAR; INTRAVENOUS at 12:54

## 2018-10-30 RX ADMIN — SODIUM CHLORIDE, SODIUM LACTATE, POTASSIUM CHLORIDE, AND CALCIUM CHLORIDE: 600; 310; 30; 20 INJECTION, SOLUTION INTRAVENOUS at 11:40

## 2018-10-30 RX ADMIN — SIMVASTATIN 10 MG: 10 TABLET, FILM COATED ORAL at 21:33

## 2018-10-30 RX ADMIN — MIDAZOLAM HYDROCHLORIDE 1 MG: 1 INJECTION, SOLUTION INTRAMUSCULAR; INTRAVENOUS at 09:36

## 2018-10-30 RX ADMIN — KETAMINE HYDROCHLORIDE 10 MG: 10 INJECTION, SOLUTION INTRAMUSCULAR; INTRAVENOUS at 10:24

## 2018-10-30 NOTE — ROUTINE PROCESS
TRANSFER - IN REPORT:    Verbal report received from Christina Hayes RN(name) on Estel Knife  being received from [facility]) for routine post - op      Report consisted of patients Situation, Background, Assessment and   Recommendations(SBAR). Information from the following report(s) SBAR, OR Summary, Intake/Output, MAR, Recent Results and Med Rec Status was reviewed with the receiving nurse. Opportunity for questions and clarification was provided. Assessment completed upon patients arrival to unit and care assumed.

## 2018-10-30 NOTE — PERIOP NOTES
TRANSFER - OUT REPORT:    Verbal report given to [address] RN (name) on Tilden Skiff  being transferred to 206(unit) for routine post - op       Report consisted of patients Situation, Background, Assessment and   Recommendations(SBAR). Information from the following report(s) SBAR, Kardex, OR Summary, Intake/Output, MAR, Recent Results and Cardiac Rhythm NSR with BBB was reviewed with the receiving nurse. Lines:   Peripheral IV 10/30/18 Posterior;Right Hand (Active)   Site Assessment Clean, dry, & intact 10/30/2018 10:23 AM   Phlebitis Assessment 0 10/30/2018 10:23 AM   Infiltration Assessment 0 10/30/2018 10:23 AM   Dressing Status Clean, dry, & intact 10/30/2018 10:23 AM   Dressing Type Transparent 10/30/2018 10:23 AM   Hub Color/Line Status Green; Infusing 10/30/2018  7:54 AM        Opportunity for questions and clarification was provided.       Patient transported with:   O2 @ 2 liters

## 2018-10-30 NOTE — OP NOTES
Patient: Alexandru Serna MRN: [id number]  SSN: [id number]    YOB: 1940  Age: 70 y.o. Sex: female        Date of Procedure: 10/30/2018   Preoperative Diagnosis: LUMBAR DISC DEGENERATION, LUMBOSACRAL DISC DEGENERATION, LUMBAGO, LUMBAR SPONDYLOLISTHESIS, LEFT BUNDLE BRANCH BLOCK (DX @  [facility] ON 3/31/03), LICHEN PLAUTUS  Postoperative Diagnosis: LUMBAR DISC DEGENERATION, LUMBOSACRAL DISC DEGENERATION, LUMBAGO, LUMBAR SPONDYLOLISTHESIS, LEFT BUNDLE BRANCH BLOCK (DX @  [facility] ON 3/15/66), LICHEN PLAUTUS    Procedure: Procedure(s):  LUMBAR 4- SACRAL 1 DECOMPRESSION AND FUSION WITH C-ARM   Surgeon(s) and Role:     * Clare Collier MD - Primary  Assistant: Anselmo Orozco PA-C  Anesthesia: General   Estimated Blood Loss: 175cc  Fluids: 1000cc   Specimens: * No specimens in log *   Findings: same  Complications: none  Implants:   Implant Name Type Inv. Item Serial No.  Lot No. LRB No. Used Action   SCR PDCL RESET SLD 7.0X50MM -- GEN 3 - [id number]  SCR PDCL RESET SLD 7.0X50MM -- GEN 3  [employer]INE[employer]ON[employer] [id number] N/A 2 Implanted   SCR PDCL RESET SLD 7.0X40MM -- GEN 3 - [id number]  SCR PDCL RESET SLD 7.0X40MM -- GEN 3  [employer]INE[employer]ON[employer]ER [id number] N/A 2 Implanted   SCR PDCL RESET SLD 7.0X45MM -- GEN 3 - [id number]  SCR PDCL RESET SLD 7.0X45MM -- GEN 3  SPINEFRONTIER [id number] N/A 2 Implanted   CHRISTIN SP LORDTC PEDFUSE 5.5X60MM --  - [id number]  CHRISTIN SP LORDTC PEDFUSE 5.5X60MM --   SPINE[employer]ON[employer]ER [id number] N/A 2 Implanted   SCR SET PEDFUSE 5-7MM --  - [id number]  SCR SET PEDFUSE 5-7MM --   SPINEFRONTIER [id number] N/A 6 Implanted   GRAFT PUTTY DBM H-GENIN 5CC --  - [id number]  GRAFT PUTTY DBM H-GENIN 5CC --  [id number] SPINEFRONTIER [id number] N/A 1 Implanted         Indications: This is a 70y.o. year-old female who presents with significant back   and bilateral lower extremity pain, worsening ability to do activities of daily living.  X-rays and MRI scan revealing severe spinal stenosis, degenerative disk disease and disk herniation. Having failed conservative care, he comes for operative intervention. DESCRIPTION OF PROCEDURE: After correct identification, the patient was   taken to the operating room, placed supine on the table, general   endotracheal anesthesia induced. 2grams of Ancef was given. Patient was then rotated prone onto the [facility] table. Lumbar spine was prepped and draped in the usual sterile fashion. Midline incision was made in the lumbar spine, taken down to the spinous processes of L4-S1. The posterior transverse processes bilaterally were expose at L4-S1 as seen on intraoperative fluoroscopy. Gelpi retractors were then placed. The wound was then irrigated. Complete wide laminectomy was performed at L5 as well as the inferior   half of L4 bilaterally and the superior half of S1 bilaterally. Removal of more than 1/2 of the medial facets bilaterally allowed excellent midline decompression. Foraminotomies which included undercutting the pars intra-articularis were then performed bilaterally at L4-S1 until a Penfield 3 was easily passed through each of the neural foramen. This allowed visualization of the L4, L5 and S1 nerve roots. The wound   was then irrigated. Bur was then used to open the posterior aspect of the   pedicles bilaterally at L4-S1. A gearshift probe was then placed through   each of these posterior bur holes, through the pedicle, into vertebral body   under intraoperative fluoroscopy. Ball-tipped probe was then placed through   each gearshift tracts, ensuring the gearshift had only gone through the bone. Pedicle screws from [employer] spinal system were then placed bilaterally at L4-S1. Rods were then fixed to the pedicle screws using the locking caps. Each of these caps were then torqued into position. Intraoperative x-ray revealed excellent alignment of the  hardware and anatomy. Wound was then irrigated with 1000cc of pulse lavage irrigation containing Bacitracin.  Bur was then used to open the posterior aspect of the facet joints and transverse process bilaterally as well as removing of the cartilage surface of the facet joints. Bone graft that was taken from the laminectomy site was then placed in the   posterolateral gutters as well as in facet joints. Drain was then placed. Fascia was then closed using #1 Vicryl suture. Subcutaneous tissue   approximated with 2-0 Vicryl suture. Skin approximated with staples. Sterile dressing was applied. The patient tolerated the procedure well and taken to Recovery room in good   condition.

## 2018-10-30 NOTE — PROGRESS NOTES
PT orders received and chart reviewed. Pt actively nauseated and dry heaving. Request hold until she feels able to fully participate w/ PT. Will return as pt schedule allows.

## 2018-10-30 NOTE — ANESTHESIA PREPROCEDURE EVALUATION
Anesthetic History No history of anesthetic complications Review of Systems / Medical History Patient summary reviewed, nursing notes reviewed and pertinent labs reviewed Pulmonary Within defined limits Neuro/Psych Within defined limits Cardiovascular Hypertension: well controlled Exercise tolerance: >4 METS 
  
GI/Hepatic/Renal 
  
GERD: well controlled PUD Endo/Other Arthritis Other Findings Physical Exam 
 
Airway Mallampati: II 
TM Distance: 4 - 6 cm Neck ROM: normal range of motion Mouth opening: Normal 
 
 Cardiovascular Regular rate and rhythm,  S1 and S2 normal,  no murmur, click, rub, or gallop Dental 
No notable dental hx Pulmonary Breath sounds clear to auscultation Abdominal 
GI exam deferred Other Findings Anesthetic Plan ASA: 2 Anesthesia type: general 
 
 
 
 
Induction: Intravenous Anesthetic plan and risks discussed with: Patient

## 2018-10-30 NOTE — ANESTHESIA POSTPROCEDURE EVALUATION
Procedure(s): LUMBAR 4- SACRAL 1 DECOMPRESSION AND FUSION WITH C-ARM . Anesthesia Post Evaluation Multimodal analgesia: multimodal analgesia used between 6 hours prior to anesthesia start to PACU discharge Patient location during evaluation: PACU Patient participation: complete - patient participated Level of consciousness: awake Pain management: adequate Airway patency: patent Anesthetic complications: no 
Cardiovascular status: acceptable Respiratory status: acceptable Hydration status: acceptable Visit Vitals /57 Pulse 68 Temp 36.2 °C (97.1 °F) Resp 11 Ht 5' 2\" (1.575 m) Wt 60.5 kg (133 lb 5 oz) SpO2 100% BMI 24.38 kg/m²

## 2018-10-30 NOTE — PROGRESS NOTES
Problem: Mobility Impaired (Adult and Pediatric)  Goal: *Acute Goals and Plan of Care (Insert Text)  In 1-7 days pt will be able to perform:  STG  1. Bed mobility:  Demonstrate proper log-roll technique for safe initiation of rolling for OOB activities. 2.  Supine to sit to supine S with HR for meals. 3.  Sit to stand to sit S with RW/LSO in prep for ambulation. LT.  Gait:  Ambulate 150ft S with RW/LSO, for home/community mobility. 2.  Stair Negotiation:  Ascend/descend >3 steps CGA with HR for home entry. 3.  Activity tolerance: Tolerate up in chair 30 minutes-1 hour for ADLs. 4.  Patient/Family Education:  Patient/family to be independent with HEP for follow-up care and safe discharge. physical Therapy EVALUATION    Patient: Reagan Kirk (58 y.o. female)  Date: 10/30/2018  Primary Diagnosis: LUMBAR DISC DEGENERATION, LUMBOSACRAL DISC DEGENERATION, LUMBAGO, LUMBAR SPONDYLOLISTHESIS, LEFT BUNDLE BRANCH BLOCK (DX @  [facility] ER ON ), LICHEN PLAUTUS  Procedure(s) (LRB):  LUMBAR 4- SACRAL 1 DECOMPRESSION AND FUSION WITH C-ARM  (N/A) Day of Surgery   Precautions:   Fall, Back    ASSESSMENT :  Based on the objective data described below, the patient presents with decreased functional mobility and independence in regard to bed mobility, transfers, gt quality and tolerance, generalized strength, pain, stair negotiation and safety due to recent back surgery. Pt rating pain in back 5/10 on numerical pain scale. Pt instructed in back precautions, log roll techniques and donning/doffing/use of LSO. Pt required CGA/min A for log roll, sidelying<>sit. Pt was nauseated and dry heaved sitting EOB and limited mobility tolerance. Pt returned to supine in bed w/ all needs within reach, SCDs applied. Nurse Vi aware and present. Recommend [facility] d/c. Patient will benefit from skilled intervention to address the above impairments.   Patients rehabilitation potential is considered to be Good  Factors which may influence rehabilitation potential include:   []         None noted  []         Mental ability/status  []         Medical condition  []         Home/family situation and support systems  []         Safety awareness  []         Pain tolerance/management  []         Other:      PLAN :  Recommendations and Planned Interventions:  []           Bed Mobility Training             []    Neuromuscular Re-Education  []           Transfer Training                   []    Orthotic/Prosthetic Training  []           Gait Training                          []    Modalities  []           Therapeutic Exercises          []    Edema Management/Control  []           Therapeutic Activities            []    Patient and Family Training/Education  []           Other (comment):    Frequency/Duration: Patient will be followed by physical therapy twice daily to address goals. Discharge Recommendations: Home Health  Further Equipment Recommendations for Discharge: N/A     SUBJECTIVE:   Patient stated Ana Maria Urrutia will try but I am very nauseated when I move.     OBJECTIVE DATA SUMMARY:     Past Medical History:   Diagnosis Date    Acid reflux     Arthritis     GERD (gastroesophageal reflux disease)     Hypertension 2004    Ill-defined condition     \"lichen plautus per paperwork    Lichen planus     PUD (peptic ulcer disease)     Unspecified adverse effect of anesthesia     itching     Past Surgical History:   Procedure Laterality Date    HX HEENT      sinus surgery    HX ORTHOPAEDIC  1964    right femur fracture pin    HX ORTHOPAEDIC  1965    bone graft right femur    HX ORTHOPAEDIC  1968    right femur pin removal    HX ORTHOPAEDIC  1990    maura bunnion feet surgery    HX ORTHOPAEDIC  1996    right hip replacement    HX ORTHOPAEDIC  2004    right knee replacement    HX ORTHOPAEDIC  2008    carpal tunnel left    HX ORTHOPAEDIC  2008    cervical surgery    HX ORTHOPAEDIC      shoulder replacement     Barriers to Learning/Limitations: None  Compensate with: visual, verbal, tactile, kinesthetic cues/model  Prior Level of Function/Home Situation:   Home Situation  Home Environment: Private residence  # Steps to Enter: 5  Rails to Enter: Yes  Hand Rails : Bilateral  One/Two Story Residence: One story  Living Alone: No  Support Systems: Spouse/Significant Other/Partner  Patient Expects to be Discharged to[de-identified] Private residence  Current DME Used/Available at Home: Brace/Splint, Walker, rolling  Critical Behavior:  Neurologic State: Alert; Appropriate for age  Orientation Level: Oriented X4  Cognition: Appropriate decision making; Appropriate for age attention/concentration; Appropriate safety awareness; Follows commands  Safety/Judgement: Awareness of environment  Psychosocial  Patient Behaviors: Calm; Cooperative  Purposeful Interaction: Yes  Pt Identified Daily Priority: Clinical issues (comment)  Caritas Process: Nurture loving kindness;Establish trust;Teaching/learning  Caring Interventions: Reassure  Reassure: Informing; Acceptance  Skin Condition/Temp: Dry;Warm  Skin Integrity: Incision (comment)(back)  Skin Integumentary  Skin Color: Appropriate for ethnicity  Skin Condition/Temp: Dry;Warm  Skin Integrity: Incision (comment)(back)  Turgor: Non-tenting  Hair Growth: Present  Varicosities: Present  Strength:    Strength: Generally decreased, functional  Tone & Sensation:   Tone: Normal  Sensation: Intact  Range Of Motion:  AROM: Generally decreased, functional  Functional Mobility:  Bed Mobility:  Rolling: Contact guard assistance; Additional time(vc)  Supine to Sit: Minimum assistance;Contact guard assistance; Additional time(vc)  Sit to Supine: Additional time;Minimum assistance(vc)  Scooting: Contact guard assistance; Additional time(vc)  Transfers:  Balance:   Sitting: Intact  Ambulation/Gait Training:  Therapeutic Exercises:   Pain:  Pain Scale 1: Numeric (0 - 10)  Pain Intensity 1: 5  Pain Location 1: Back  Pain Orientation 1: Medial  Pain Description 1: Aching  Pain Intervention(s) 1: Encouraged PCA  Activity Tolerance:   Fair   Please refer to the flowsheet for vital signs taken during this treatment. After treatment:   []         Patient left in no apparent distress sitting up in chair  [x]         Patient left in no apparent distress in bed  [x]         Call bell left within reach  [x]         Nursing notified  []         Caregiver present  []         Bed alarm activated    COMMUNICATION/EDUCATION:   [x]         Fall prevention education was provided and the patient/caregiver indicated understanding. [x]         Patient/family have participated as able in goal setting and plan of care. [x]         Patient/family agree to work toward stated goals and plan of care. []         Patient understands intent and goals of therapy, but is neutral about his/her participation. []         Patient is unable to participate in goal setting and plan of care. Thank you for this referral.  Gisel Holt, PT   Time Calculation: 24 mins        Eval Complexity: History: HIGH Complexity :3+ comorbidities / personal factors will impact the outcome/ POC Exam:MEDIUM Complexity : 3 Standardized tests and measures addressing body structure, function, activity limitation and / or participation in recreation  Presentation: MEDIUM Complexity : Evolving with changing characteristics  Clinical Decision Making:High Complexity no standing Overall Complexity:MEDIUM       Mobility  Current  CJ= 20-39%   Goal  CI= 1-19%. The severity rating is based on the Level of Assistance required for Functional Mobility and ADLs.

## 2018-10-30 NOTE — PROGRESS NOTES
1501 Patient arrives to unit at this time. Dual skin assessment completed with Santo Singh RN, no abnormalities noted besides surgical incision to back. Admission assessment completed. Patient is A/O x 4. Lungs clear, radial pulses palpable , pedal pulses palpable, abdomen soft. Bowel sounds active in all 4 quadrants, 18 G IV to right hand intact and patent LR infusing. No signs of phlebitis or infiltration noted. TEDs and SCDs applied bilateral. Skin warm and dry  with  abd dressing to back clean dry and intact. Patient denies numbness/tingling. Pain 6/10. Patient was oriented to the room to include use of call bell, meal ordering, and use of incentive spirometer, patient was too nauseous to do the incentive spirometer. Patient was given explanation of \" up for dinner\" program and has verbalized understanding. Phone and call bell left within reach. Non slip socks on. Plan of care for the day addressed with patient. Educated on pain medication availability and possible side effects as well as need to call for assistance before getting out of bed, patient verbalized understanding. Patient was experiencing some nausea. Hemovac was clean, dry, intact, patent, and charged. Valdovinos is draining and bellow the patients bladder. Pt educated on PCA pump.     1708 Pt experiencing nausea and vomiting. PRN Zofran 4 mg given. 1935 Bedside and Verbal shift change report given to [address]  by Sujata Ogden RN. Report included the following information SBAR, Kardex, Intake/Output, MAR and Recent Results. Pt in no distress,call bell within reach, and gripper socks on.

## 2018-10-30 NOTE — PROGRESS NOTES
Problem: Falls - Risk of  Goal: *Absence of Falls  Document Zack Fall Risk and appropriate interventions in the flowsheet. Outcome: Progressing Towards Goal  Fall Risk Interventions:  Mobility Interventions: PT Consult for mobility concerns, PT Consult for assist device competence, Patient to call before getting OOB, Utilize walker, cane, or other assistive device         Medication Interventions: Patient to call before getting OOB, Teach patient to arise slowly    Elimination Interventions:  Toileting schedule/hourly rounds, Patient to call for help with toileting needs, Call light in reach

## 2018-10-30 NOTE — INTERVAL H&P NOTE
H&P Update:  Leopold Larsen was seen and examined. History and physical has been reviewed. The patient has been examined.  There have been no significant clinical changes since the completion of the originally dated History and Physical.    Signed By: Peterson Franz MD     October 30, 2018 8:48 AM

## 2018-10-31 VITALS
HEIGHT: 62 IN | BODY MASS INDEX: 24.53 KG/M2 | DIASTOLIC BLOOD PRESSURE: 51 MMHG | HEART RATE: 88 BPM | TEMPERATURE: 98.9 F | RESPIRATION RATE: 16 BRPM | SYSTOLIC BLOOD PRESSURE: 124 MMHG | OXYGEN SATURATION: 99 % | WEIGHT: 133.31 LBS

## 2018-10-31 PROBLEM — M48.062 SPINAL STENOSIS OF LUMBAR REGION WITH NEUROGENIC CLAUDICATION: Status: RESOLVED | Noted: 2018-10-17 | Resolved: 2018-10-31

## 2018-10-31 PROBLEM — M51.26 HNP (HERNIATED NUCLEUS PULPOSUS), LUMBAR: Status: RESOLVED | Noted: 2018-10-17 | Resolved: 2018-10-31

## 2018-10-31 LAB
ERYTHROCYTE [DISTWIDTH] IN BLOOD BY AUTOMATED COUNT: 17.1 % (ref 11.6–14.5)
HCT VFR BLD AUTO: 25.3 % (ref 35–45)
HGB BLD-MCNC: 8.2 G/DL (ref 12–16)
MCH RBC QN AUTO: 27.2 PG (ref 24–34)
MCHC RBC AUTO-ENTMCNC: 32.4 G/DL (ref 31–37)
MCV RBC AUTO: 84.1 FL (ref 74–97)
PLATELET # BLD AUTO: 205 K/UL (ref 135–420)
PMV BLD AUTO: 10.4 FL (ref 9.2–11.8)
RBC # BLD AUTO: 3.01 M/UL (ref 4.2–5.3)
WBC # BLD AUTO: 8.9 K/UL (ref 4.6–13.2)

## 2018-10-31 PROCEDURE — 97530 THERAPEUTIC ACTIVITIES: CPT

## 2018-10-31 PROCEDURE — 36415 COLL VENOUS BLD VENIPUNCTURE: CPT | Performed by: PHYSICIAN ASSISTANT

## 2018-10-31 PROCEDURE — 74011250637 HC RX REV CODE- 250/637: Performed by: PHYSICIAN ASSISTANT

## 2018-10-31 PROCEDURE — 74011250636 HC RX REV CODE- 250/636: Performed by: ANESTHESIOLOGY

## 2018-10-31 PROCEDURE — 77030011256 HC DRSG MEPILEX <16IN NO BORD MOLN -A

## 2018-10-31 PROCEDURE — 97535 SELF CARE MNGMENT TRAINING: CPT

## 2018-10-31 PROCEDURE — 85027 COMPLETE CBC AUTOMATED: CPT | Performed by: PHYSICIAN ASSISTANT

## 2018-10-31 PROCEDURE — 74011250636 HC RX REV CODE- 250/636: Performed by: ORTHOPAEDIC SURGERY

## 2018-10-31 PROCEDURE — 97167 OT EVAL HIGH COMPLEX 60 MIN: CPT

## 2018-10-31 PROCEDURE — 77030037875 HC DRSG MEPILEX <16IN BORD MOLN -A

## 2018-10-31 PROCEDURE — 97116 GAIT TRAINING THERAPY: CPT

## 2018-10-31 RX ORDER — ONDANSETRON 2 MG/ML
4 INJECTION INTRAMUSCULAR; INTRAVENOUS
Status: DISCONTINUED | OUTPATIENT
Start: 2018-10-31 | End: 2018-10-31 | Stop reason: HOSPADM

## 2018-10-31 RX ORDER — HYDROCODONE BITARTRATE AND ACETAMINOPHEN 5; 325 MG/1; MG/1
1-1.5 TABLET ORAL
Qty: 84 TAB | Refills: 0 | Status: SHIPPED | OUTPATIENT
Start: 2018-10-31

## 2018-10-31 RX ORDER — NALOXONE HYDROCHLORIDE 4 MG/.1ML
SPRAY NASAL
Qty: 1 EACH | Refills: 0 | Status: SHIPPED
Start: 2018-10-31

## 2018-10-31 RX ORDER — LUBIPROSTONE 24 UG/1
24 CAPSULE, GELATIN COATED ORAL 2 TIMES DAILY WITH MEALS
Qty: 60 CAP | Refills: 0 | Status: SHIPPED | OUTPATIENT
Start: 2018-10-31 | End: 2018-11-30

## 2018-10-31 RX ORDER — DIAZEPAM 5 MG/1
2.5 TABLET ORAL
Qty: 60 TAB | Refills: 0 | Status: SHIPPED | OUTPATIENT
Start: 2018-10-31

## 2018-10-31 RX ORDER — NALOXONE HYDROCHLORIDE 2 MG/.4ML
2 INJECTION, SOLUTION INTRAMUSCULAR; SUBCUTANEOUS
Qty: 1 DEVICE | Refills: 0 | Status: SHIPPED | OUTPATIENT
Start: 2018-10-31 | End: 2018-10-31

## 2018-10-31 RX ADMIN — ONDANSETRON 4 MG: 2 INJECTION INTRAMUSCULAR; INTRAVENOUS at 08:54

## 2018-10-31 RX ADMIN — Medication 2 G: at 01:24

## 2018-10-31 RX ADMIN — LUBIPROSTONE 24 MCG: 24 CAPSULE, GELATIN COATED ORAL at 10:53

## 2018-10-31 RX ADMIN — HYDROCODONE BITARTRATE AND ACETAMINOPHEN 1 TABLET: 7.5; 325 TABLET ORAL at 12:13

## 2018-10-31 RX ADMIN — Medication 2 G: at 08:59

## 2018-10-31 RX ADMIN — ATORVASTATIN CALCIUM 20 MG: 20 TABLET, FILM COATED ORAL at 10:53

## 2018-10-31 RX ADMIN — DOCUSATE SODIUM 100 MG: 100 CAPSULE, LIQUID FILLED ORAL at 10:54

## 2018-10-31 RX ADMIN — SODIUM CHLORIDE, SODIUM LACTATE, POTASSIUM CHLORIDE, AND CALCIUM CHLORIDE 125 ML/HR: 600; 310; 30; 20 INJECTION, SOLUTION INTRAVENOUS at 05:18

## 2018-10-31 RX ADMIN — OMEPRAZOLE 40 MG: 20 CAPSULE, DELAYED RELEASE ORAL at 10:53

## 2018-10-31 RX ADMIN — Medication 400 MG: at 10:53

## 2018-10-31 NOTE — DISCHARGE INSTRUCTIONS
[facility]  Dr. Lala Lenlynda ANYONE WHO SMOKES. AVOID ALL PRODUCTS THAT CONTAIN NICOTINE. DO NOT TAKE IBUPROFEN OR ANTI--INFLAMMATORIES, AS THESE MAY ALTER THE HEALING OF THE FUSION. ACTIVITIES :  *The first week after surgery   1. You may be up and walking about the house. 2.  Activities around the house, such as washing dishes, fixing light meals, and your own personal care are fine. 3.  Avoid strenuous activities, such as vacuuming, lifting laundry or grocery bags. 4.  Do not lift anything heavier than 1 gallon of milk (or about 5-8 pounds). 5.  Do not bend over to  items from the ground level until 3 months post-op. *Week 2 and beyond  1. You may gradually increase your activities, but still avoid heavy lifting, pushing/pulling. 2.  Walking is the best way to rebuild strength and stamina. Start SLOWLY and gradually increase the distance a little every week. 3.  Walk at a pace that avoids fatigue or severe pain. Do not try to walk several blocks the first day! As you increase the distance, you may feel tired. If so, stop and rest.   4.  You should be able to walk several blocks by your first clinic visit. 5.  Follow-up with Dr. Yanci Adams in 10-14 days. BATHING and INCISION CARE:  1. The incision may be tender to touch or feel numb: this is normal.   2.  Keep the incision clean and dry. Do not get incision wet for 5 days. The incision will be closed with sutures under the skin and the skin will be glued. 3.  Do not apply any lotions, ointments or oils on the incision. 4.  If you notice any excessive swelling, redness, or persistent drainage around the incision, notify the office immediately. DRIVIN. You should not drive until after your follow-up appointment. 2.  You can be in a vehicle for short distances, but if you travel any long distance, please stop about every 30 minutes and walk/stretch. 3.  You should NEVER drive while taking narcotic medication. RETURN TO WORK :  1. The decision to return to work will be determined on an individual basis. 2.  Many people who have a strenuous job (construction, heavy labor, etc) may need to be off work for up to 12 weeks. 3.  If you need a work note, please let us know as soon as possible, and not the same day you are planning to return to work. NUTRITION :  1.  Good nutrition is an essential part of healing. 2.  You should eat a balanced diet each day, including fruits, vegetables, dairy products and protein. 3.  Remember to drink plenty of water. 4.  If you have not had a bowel movement within 3 days of surgery, you will need to use a laxative or suppository that can be obtained over-the-counter at your local pharmacy. BONE STIMULATOR:  1. A spinal bone stimulator may be prescribed for you under certain situations. 2.  A nurse will call you if one has been requested and discuss its use for approximately 4-6 months post-op every day. 3.  This device assists in bone healing and fusion. MEDICATIONS -  1. You may resume the medications you were taking before surgery, with the general exception of (or DO NOT TAKE) non-steroidal anti-inflammatory medications, such as Motrin, Aleve, Advil Naprosyn, Ibuprofen or aspirin. 2.  You will receive a prescription for pain medication at discharge from the hospital. The pain medication works best if taken before the pain becomes severe. 3.  To reduce stomach upset, always take the medication with food. 4.  Begin to wean yourself off the pain medication during the second week after discharge. 5.  If you need a refill, please call the office during working hours at least 2 days before your prescription runs out. Do not wait until your bottle is empty to call for a refill. 6.  DO NOT drive if you are taking narcotic pain medications.     HOME HEALTH CARE:  1.   A home health care service has been set-up for you to help assist you once you leave the hospital.  2.  They will contact you either before you leave the hospital or within 24 hours once you have been discharged home. 3. A nurse will assist you with your dressing changes and a Physical Therapist with help you with your therapy needs. CALL THE OFFICE:   If you have severe pain unrelieved by the medications, new numbness or tingling in your legs;   If you have a fever of 101.0°F or greater;    If you notice excessive swelling, redness, or persistent drainage from the incision or IV site; [facility] office number is [phone] from 8:00am to 5:00pm Monday through Friday. After 5:00pm, on weekends, or holidays, please leave a message with our answering service and the doctor on-call will get back to you shortly.

## 2018-10-31 NOTE — PROGRESS NOTES
Problem: Falls - Risk of  Goal: *Absence of Falls  Document Zack Fall Risk and appropriate interventions in the flowsheet.   Outcome: Progressing Towards Goal  Fall Risk Interventions:  Mobility Interventions: Patient to call before getting OOB, Strengthening exercises (ROM-active/passive), Utilize walker, cane, or other assistive device         Medication Interventions: Patient to call before getting OOB, Teach patient to arise slowly    Elimination Interventions: Call light in reach, Toileting schedule/hourly rounds, Toilet paper/wipes in reach, Patient to call for help with toileting needs(janice)

## 2018-10-31 NOTE — PROGRESS NOTES
1925 Assumed pt care at this time. Report received from Alexey Schwartz RN. Pt rating pain 0/10. Pt in bed in lowest position with wheels locked. Call light within reach. Will continue to monitor. 2143 Administered 2.5 mg valium prn. Pt rating pain 3/10.     2145 Assessment complete. Pt is alert and oriented x 4. Denies SOB and chest pain. Pt lungs clear bilaterally. Capillary refill less3 than 3 seconds. Pt denies numbness and tingling in all extremities. Stated pain 3/10. Pt has 18 G IV to R hand. Pt has ABD pad with 4x4 dressing. SCD's and TEDs applied to BLE. Pt encouraged to continue use of IS. Pt verbalized understanding. Ice pack applied. Call light and possessions within reach. Bed in lowest position. Will continue to monitor. 0129 Emptied 575 mL of clear yellow urine out of pt's camacho. 0530 Pt's camacho catheter removed at this time. Removed 675 mL of clear yellow urine at this time. Removed 50 mL from pt's hemovac. Shift summary: Pt is alert and oriented x 4. Pt had an uneventful shift. Pt voiding sufficient amounts. Pain was controlled by PCA pump.

## 2018-10-31 NOTE — PROGRESS NOTES
Problem: Mobility Impaired (Adult and Pediatric)  Goal: *Acute Goals and Plan of Care (Insert Text)  In 1-7 days pt will be able to perform:  STG  1. Bed mobility:  Demonstrate proper log-roll technique for safe initiation of rolling for OOB activities. 2.  Supine to sit to supine S with HR for meals. 3.  Sit to stand to sit S with RW/LSO in prep for ambulation. LT.  Gait:  Ambulate 150ft S with RW/LSO, for home/community mobility. 2.  Stair Negotiation:  Ascend/descend >3 steps CGA with HR for home entry. 3.  Activity tolerance: Tolerate up in chair 30 minutes-1 hour for ADLs. 4.  Patient/Family Education:  Patient/family to be independent with HEP for follow-up care and safe discharge. Outcome: Progressing Towards Goal  Pt refused PT due to:  [x]  Nausea/vomiting  []  Eating  []  Pain  []  Pt lethargic  [x]  Other, Pt reports feeling nausea and just finished w/ treatment session w/ OT and wants to rest.  Will f/u later as schedule allows. Thank you.   Mary Cummins, PT

## 2018-10-31 NOTE — PROGRESS NOTES
5799  - Patient in bed at this time. A/O x 4. Lungs clear, radial pulses present , pedal pulses present , abdomen soft and non-distended. Bowel sounds active, 18 G IV to right hand  Intact, patent and infusing. No signs of phlebitis or infiltration noted. TEDs and SCDs applied bilaterally. Skin warm and dry  with  ABD/tap dressing to back CDI. Hemovac drain in place, patent, charged and draining. Patient denies numbness/tingling. Pain 3/10. Bed placed in lowest position, call bell within reach. [place] contacted to change oral Zofran to IV Zofran due to patient vomiting. 5505- Patient complained of nausea, PRN Zofran 4 mg IV given at this time as ordered for nausea, will continue to monitor for effectiveness. 1125- message left for Mendoza Vivas., PA about patients complaints of \"wooshing sound\" in right ear that comes and goes and to see if BP medications should be given     1130- Dr. Rosemarie Sapp returned call, informed of above. Stated to inform patient sound could be from anesthesia, ok to hold BP meds today. 1215- Pain 5/10. PRN Norco 7.5 mg PO pain medication administered at this time. Patient has been educated on side effects. Side effect education sheets have been provided. 1250- Hemovac drain removed at this time. Staples in place, no drainage noted. mepilex dressing applied to incision and from spot of hemovac removal. Patient tolerated well.

## 2018-10-31 NOTE — PROGRESS NOTES
Progress Note POD #1      Patient: Scotty Bae               Sex: female          DOA: 10/30/2018         YOB: 1940      Surgery: Procedure(s):  LUMBAR 4- SACRAL 1 DECOMPRESSION AND FUSION WITH C-ARM            LOS: 0 days               Subjective:     No new complaints. Nausea better. Denies leg pain. Comfortable now. Objective:      Visit Vitals  /57   Pulse 85   Temp 99.8 °F (37.7 °C)   Resp 14   Ht 5' 2\" (1.575 m)   Wt 60.5 kg (133 lb 5 oz)   SpO2 95%   BMI 24.38 kg/m²       Physical Exam:  Neurological: motor strength: 5/5 in lower extremities bilaterally                          sensation: intact to light touch  Patient mobility  Bed Mobility Training  Rolling: Contact guard assistance, Additional time(vc)  Supine to Sit: Minimum assistance, Contact guard assistance, Additional time(vc)  Sit to Supine: Additional time, Minimum assistance(vc)  Scooting: Contact guard assistance, Additional time(vc)                      Intake and Output:  Current Shift:  No intake/output data recorded. Last three shifts:  10/29 1901 - 10/31 0700  In: 3344.4 [P.O.:150;  I.V.:3194.4]  Out: 1877 [Urine:1600; Drains:100]    Lab/Data Reviewed:    Lab/Data Reviewed:  Lab Results   Component Value Date/Time    WBC 8.9 10/31/2018 02:15 AM    HGB 8.2 (L) 10/31/2018 02:15 AM    HCT 25.3 (L) 10/31/2018 02:15 AM    PLATELET 713 74/68/9331 02:15 AM    MCV 84.1 10/31/2018 02:15 AM     Lab Results   Component Value Date/Time    aPTT 25.5 02/28/2014 03:07 PM     Lab Results   Component Value Date/Time    INR 1.0 03/11/2014 02:10 AM    INR 1.0 02/28/2014 03:07 PM    Prothrombin time 12.1 03/11/2014 02:10 AM    Prothrombin time 12.5 02/28/2014 03:07 PM            Assessment/Plan     Principal Problem:    Spinal stenosis of lumbar region with neurogenic claudication (10/17/2018)    Active Problems:    HNP (herniated nucleus pulposus), lumbar (10/17/2018)      DDD (degenerative disc disease), lumbar (10/17/2018)        1. Stable  2. OOB with PT  3. D/C Planning  4. D/C PCA  5. D/C camacho  6. D/C drain & change dressing prior to discharge home. 7.Discharge to home after being cleared by P.T  8. Mild ABL, asymptomatic. Will continue to monitor.

## 2018-10-31 NOTE — PROGRESS NOTES
Transition of Care (CECILE) Plan:      Chart reviewed, met with pt prior to PT, at this time pt has cleared PT for discharge home. Pt lives with spouse, who has health problems, but is able to assist minimally at home. FOC offered, pt chose [facility] [phone] for follow up; referral placed with [facility]. Pt has RW for home. CECILE Transportation:   How is patient being transported at discharge? Family/Friend      When? Once cleared by Therapy between 12-2pm     Is transport scheduled? N/A      Follow-up appointment and transportation:   PCP/Specialist?  See AVS for Appointment         Who is transporting to the follow-up appointment? Family/Friend      Is transport for follow up appointment scheduled? N/A    Communication plan (with patient/family): Who is being called? Patient or Next of Kin? Responsible party? Patient      What number(s) is to be used? See Facesheet      What service provider is calling for [facility] services? When are they calling? 24-48 hours following discharge    Readmission Risk? (Green/Low; Yellow/Moderate; Red/High):  Green    Care Management Interventions  PCP Verified by CM:  Yes  Transition of Care Consult (CM Consult): [address]: No  Reason Outside [place]: Physician referred to specific agency([facility])  Discharge Durable Medical Equipment: No  Physical Therapy Consult: Yes  Occupational Therapy Consult: Yes  Current Support Network: Lives with Spouse, Own Home  Confirm Follow Up Transport: Family  Plan discussed with Pt/Family/Caregiver: Yes  Freedom of Choice Offered: Yes  Discharge Location  Discharge Placement: Home with home health

## 2018-10-31 NOTE — PROGRESS NOTES
Problem: Self Care Deficits Care Plan (Adult)  Goal: *Acute Goals and Plan of Care (Insert Text)  Initial Occupational Therapy Goals (10/31/2018) Within 7 day(s):    1. Patient will perform toilet transfer with Supervision/mod I in preparation for bowel and bladder management. 2. Patient will perform bowel and bladder management with Setup for increased independence with ADLs. 3. Patient will perform UB dressing with setup (including back brace) for increased independence with ADLs. 4. Patient will perform LB dressing with setup/Alexia & A/E PRN for increased independence with ADLs. 5. Patient will adhere to back/spinal precautions 100% of the time with 1-2 verbal cues for increased independence with ADLs. 6. Patient will utilize energy conservation techniques with 1 verbal cue(s) for increased independence with ADLs. Outcome: Progressing Towards Goal  Occupational Therapy EVALUATION    Patient: Dav Major (71 y.o. female)  Date: 10/31/2018  Primary Diagnosis: LUMBAR DISC DEGENERATION, LUMBOSACRAL DISC DEGENERATION, LUMBAGO, LUMBAR SPONDYLOLISTHESIS, LEFT BUNDLE BRANCH BLOCK (DX @  THE [facility] ER ON 8/34/43), LICHEN PLAUTUS  Procedure(s) (LRB):  LUMBAR 4- SACRAL 1 DECOMPRESSION AND FUSION WITH C-ARM  (N/A) 1 Day Post-Op   Precautions:  Fall, Back(LSO)    ASSESSMENT :  Based on the objective data described below, the patient presents with decreased functional strength, decreased functional balance, decreased overall activity tolerance limiting independence with ADLs. Pt greatly limited by nausea this date w/ orthostatic hypotension when toileting. Pt still willing to participate and assisted in dressing despite emesis. Pt unable to secure brace d/t \"bloating\". Significant rest breaks to assist pt medically to be able to safely get back to bed w/ RN/Fela present. Pt would benefit from continued OT services to maximize independence in ADLs.     Education: Reviewed Neck/Back Precautions, Collar/Brace management, body mechanics, home safety, adaptive strategies and adaptive dressing techniques including clothing modifications with patient verbalizing understanding at this time. Patient will benefit from skilled intervention to address the above impairments. Patients rehabilitation potential is considered to be Good  Factors which may influence rehabilitation potential include:   []             None noted  []             Mental ability/status  [x]             Medical condition  []             Home/family situation and support systems  []             Safety awareness  [x]             Pain tolerance/management  []             Other:      PLAN :  Recommendations and Planned Interventions:  [x]               Self Care Training                  [x]        Therapeutic Activities  [x]               Functional Mobility Training    [x]        Cognitive Retraining  [x]               Therapeutic Exercises           [x]        Endurance Activities  [x]               Balance Training                   [x]        Neuromuscular Re-Education  []               Visual/Perceptual Training     [x]   Home Safety Training  [x]               Patient Education                 [x]        Family Training/Education  []               Other (comment):    Frequency/Duration: Patient will be followed by occupational therapy 1-2 times per day/2-4 days per week to address goals. Discharge Recommendations: Home Health  Further Equipment Recommendations for Discharge: To Be Determined (TBD) at next level of care     SUBJECTIVE:   Patient stated I'm sorry. This is miserable.     OBJECTIVE DATA SUMMARY:     Past Medical History:   Diagnosis Date    Acid reflux     Arthritis     GERD (gastroesophageal reflux disease)     Hypertension 2004    Ill-defined condition     \"lichen plautus per paperwork    Lichen planus     PUD (peptic ulcer disease)     Unspecified adverse effect of anesthesia     itching     Past Surgical History:   Procedure Laterality Date    HX HEENT      sinus surgery    HX ORTHOPAEDIC  1964    right femur fracture pin    HX ORTHOPAEDIC  1965    bone graft right femur    HX ORTHOPAEDIC  1968    right femur pin removal    HX ORTHOPAEDIC  1990    maura bunnion feet surgery    HX ORTHOPAEDIC  1996    right hip replacement    HX ORTHOPAEDIC  2004    right knee replacement    HX ORTHOPAEDIC  2008    carpal tunnel left    HX ORTHOPAEDIC  2008    cervical surgery    HX ORTHOPAEDIC      shoulder replacement     Barriers to Learning/Limitations: yes;  physical  Compensate with: visual, verbal, tactile, kinesthetic cues/model    Prior Level of Function/Home Situation: Pt reports supportive spouse; pt reports previous knowledge of use of A/E  Home Situation  Home Environment: Private residence  # Steps to Enter: 5  Rails to Enter: Yes  Hand Rails : Bilateral  One/Two Story Residence: One story  Living Alone: No  Support Systems: Spouse/Significant Other/Partner  Patient Expects to be Discharged to[de-identified] Private residence  Current DME Used/Available at Home: Brace/Splint, Walker, rolling  Tub or Shower Type: Shower  [x]  Right hand dominant   []  Left hand dominant    Cognitive/Behavioral Status:  Neurologic State: Alert; Appropriate for age  Orientation Level: Oriented X4;Appropriate for age  Cognition: Appropriate decision making; Appropriate for age attention/concentration; Appropriate safety awareness; Follows commands  Safety/Judgement: Awareness of environment; Fall prevention; Insight into deficits    Skin: Back incision w/ dressing & HemeVAC  Edema: moderate abdominal edema associated w/ recent surgery     Vision/Perceptual:     appears WFL      Coordination:  Coordination: Within functional limits  Fine Motor Skills-Upper: Left Intact; Right Intact    Gross Motor Skills-Upper: Left Intact; Right Intact    Balance:  Sitting: Intact  Standing: Intact; With support    Strength:  Strength: Generally decreased, functional  Tone & Sensation:  Tone: Normal  Sensation: Intact  Range of Motion:  AROM: Generally decreased, functional  PROM: Generally decreased, functional    Functional Mobility and Transfers for ADLs:  Bed Mobility:  Rolling: Minimal assist  Supine to Sit: Moderate assist  Sit to Supine: Moderate assist  Scooting: Contact guard assistance  Transfers:  Sit to Stand: Contact guard assistance  Bed to Chair: Contact guard assistance   Toilet Transfer : Contact guard assistance   Bathroom Mobility: Contact guard assistance    ADL Assessment:   Feeding: Setup    Oral Facial Hygiene/Grooming: Contact guard assistance    Bathing: Minimum assistance; Additional time    Upper Body Dressing: Setup;Minimum assistance; Additional time    Lower Body Dressing: Minimum assistance; Moderate assistance; Additional time; Adaptive equipment    Toileting: Contact guard assistance; Additional time; Adaptive equipment    ADL Intervention:  Feeding  Feeding Assistance: Supervision/set-up    Grooming  Washing Hands: Supervision/set-up    Upper Body Dressing Assistance  Orthotics(Brace): Minimum assistance; Compensatory technique training  Pullover Shirt: Supervision/set-up    Lower Body Dressing Assistance  Underpants: Contact guard assistance  Pants With Elastic Waist: Contact guard assistance  Socks: Moderate assistance  Position Performed: Seated edge of bed    LE Adaptive Equipment:  [x] sock aid  [x] reacher   [x] long handle sponge  [x] long handle shoe horn  [] leg    was issued in order to maximize patient's independence for independent living/decrease caregiver burden/assist with co-morbidities affecting function and body mechanics.     Toileting  Toileting Assistance: Contact guard assistance;Stand-by assistance  Bladder Hygiene: Supervision/set-up  Clothing Management: Contact guard assistance  Cues: Tactile cues provided;Verbal cues provided;Visual cues provided  Adaptive Equipment: Walker;Grab bars    Cognitive Retraining  Problem Solving: Inductive reason; Identifying the task; Identifying the problem;General alternative solution;Deductive reason; Awareness of environment  Executive Functions: Executing cognitive plans  Organizing/Sequencing: Breaking task down;Prioritizing  Safety/Judgement: Awareness of environment; Fall prevention; Insight into deficits  Cues: Tactile cues provided;Verbal cues provided;Visual cues provided    Pain:  Pre-treatment: 3/10  Post-treatment: 5/10    Activity Tolerance:   Patient able to stand <1 minute(s). Patient able to complete ADLs with/ frequent rest breaks. Patient limited by emesis/orthostatis hypotension/strength/balance. Please refer to the flowsheet for vital signs taken during this treatment. After treatment:   [] Patient left in no apparent distress sitting up in chair  [x] Patient left in no apparent distress in bed  [x] Call bell left within reach  [x] Nursing notified/Fela  [] Caregiver present  [] Bed alarm activated    COMMUNICATION/EDUCATION:   [x] Home safety education was provided and the patient/caregiver indicated understanding. [x] Patient/family have participated as able in goal setting and plan of care. [x] Patient/family agree to work toward stated goals and plan of care. [] Patient understands intent and goals of therapy, but is neutral about his/her participation. [] Patient is unable to participate in goal setting and plan of care. Thank you for this referral.  Lety Franklin, OTR/L  Time Calculation: 56 mins    G-Codes (GP)  Self Care   Current  CK= 40-59%   Goal  CI= 1-19%  The severity rating is based on the professional judgement & direct observation of Level of Assistance required for Functional Mobility and ADLs. Eval Complexity: History: HIGH Complexity : Extensive review of history including physical, cognitive and psychosocial history ;    Examination: HIGH Complexity : 5 or more performance deficits relating to physical, cognitive , or psychosocial skils that result in activity limitations and / or participation restrictions; Decision Making:HIGH Complexity : Patient presents with comorbidities that affect occupational performance.  Signifigant modification of tasks or assistance (eg, physical or verbal) with assessment (s) is necessary to enable patient to complete evaluation

## 2018-10-31 NOTE — ROUTINE PROCESS
Dual AVS reviewed with Kennedy Christie RN. All medications reviewed individually with patient. Opportunities for questions and concerns provided. Patient discharged via (mode of transport ie. Car, ambulance or air transport) car. Patient's arm band appropriately discarded.

## 2018-10-31 NOTE — PROGRESS NOTES
Problem: Mobility Impaired (Adult and Pediatric)  Goal: *Acute Goals and Plan of Care (Insert Text)  In 1-7 days pt will be able to perform:  STG  1. Bed mobility:  Demonstrate proper log-roll technique for safe initiation of rolling for OOB activities. 2.  Supine to sit to supine S with HR for meals. 3.  Sit to stand to sit S with RW/LSO in prep for ambulation. LT.  Gait:  Ambulate 150ft S with RW/LSO, for home/community mobility. 2.  Stair Negotiation:  Ascend/descend >3 steps CGA with HR for home entry. 3.  Activity tolerance: Tolerate up in chair 30 minutes-1 hour for ADLs. 4.  Patient/Family Education:  Patient/family to be independent with HEP for follow-up care and safe discharge. Outcome: Resolved/Met Date Met: 10/31/18  physical Therapy TREATMENT/DISCHARGE    Patient: Adair Zaidi (25 y.o. female)  Date: 10/31/2018  Diagnosis: LUMBAR DISC DEGENERATION, LUMBOSACRAL DISC DEGENERATION, LUMBAGO, LUMBAR SPONDYLOLISTHESIS, LEFT BUNDLE BRANCH BLOCK (DX @  THE [facility] ER ON ), LICHEN PLAUTUS Spinal stenosis of lumbar region with neurogenic claudication  Procedure(s) (LRB):  LUMBAR 4- SACRAL 1 DECOMPRESSION AND FUSION WITH C-ARM  (N/A) 1 Day Post-Op  Precautions: Fall, Back  Chart, physical therapy assessment, plan of care and goals were reviewed. ASSESSMENT:  Pt seen in supine prior to session w/ IV, heme vac, and B/L SCDs donned. Pt reported 3/10 pain in lower back. Pt has met all goals at this time. Pt's BP assessed prior to session at 133/58 upon sitting at the EOB. Pt reports nausea has subsided. Pt requires assistance w/ donning the brace however pt requires assist and the brace is tight, pt educated that she may require assistance w/ brace once d/c home until swelling decreases. Pt able to ambulate w/ RW/GB/Brace 150 ft w/ min Vcing and difficulty and no signs of LOB at this time. Pt able to perform step negotiation using R HR side step technique  w/ min VCing and no signs of LOB.  Pt transferred back to room where pt was educated on B/L LE therex activity and able to perform task w/ min Vcing and difficulty. Pt left in supine after session, call bell and tray in reach, pts BP assessed at 124/51, nurse notified after session. Pt has met all goals at this time, DC from acute PT. Progression toward goals:  [x]      Goals met  []      Improving appropriately and progressing toward goals  []      Improving slowly and progressing toward goals  []      Not making progress toward goals and plan of care will be adjusted     PLAN:  Patient will be discharged from physical therapy at this time. Rationale for discharge:  [x] Goals Achieved  [] [address]  [] Patient not participating in therapy  [] Other:  Discharge Recommendations:  Home Health  Further Equipment Recommendations for Discharge:  N/A     SUBJECTIVE:   Patient stated I feel okay right now.     OBJECTIVE DATA SUMMARY:   Critical Behavior:  Neurologic State: Alert, Appropriate for age  Orientation Level: Oriented X4  Cognition: Appropriate decision making, Appropriate for age attention/concentration, Appropriate safety awareness, Follows commands  Safety/Judgement: Awareness of environment  Functional Mobility Training:  Bed Mobility:  Rolling: Modified independent;Supervision  Supine to Sit: Modified independent;Supervision  Sit to Supine: Modified independent;Supervision  Scooting: Modified independent;Supervision  Transfers:  Sit to Stand: Modified independent;Supervision  Stand to Sit: Modified independent;Supervision  Balance:  Sitting: Intact  Standing: Intact; With support  Ambulation/Gait Training:  Distance (ft): 200 Feet (ft)  Assistive Device: Brace/Splint;Gait belt;Walker, rolling  Ambulation - Level of Assistance: Modified independent;Supervision  Gait Abnormalities: Decreased step clearance  Base of Support: Narrowed  Speed/Milena: Slow  Swing Pattern: Left asymmetrical;Right asymmetrical  Stairs:  Number of Stairs Trained: 5  Stairs - Level of Assistance: Supervision;Contact guard assistance   Rail Use: Right   Therapeutic Exercises:   Educated on the B/L LE ( LAQs, ankle pumps, and heel slides)  Pain:  Pain Scale 1: Numeric (0 - 10)  Pain Intensity 1: 3  Pain Location 1: Back  Pain Orientation 1: Medial  Pain Description 1: Aching  Pain Intervention(s) 1: Rest  Activity Tolerance:   Good  Please refer to the flowsheet for vital signs taken during this treatment. After treatment:   [] Patient left in no apparent distress sitting up in chair  [x] Patient left in no apparent distress in bed  [x] Call bell left within reach  [x] Nursing notified  [] Caregiver present  [] Bed alarm activated  Mera Langford, PT   Time Calculation: 34 mins   Mobility:   Goal  CI= 1-19%  D/C  CI= 1-19%. The severity rating is based on the Other Based on functional assessment

## 2018-10-31 NOTE — ROUTINE PROCESS
Bedside and verbal shift change report given to Socrates Hernandes RN (oncoming nurse) by Otto Moss RN (offgoing nurse). Report included the following information: SBAR, Kardex, MAR, and recent results.

## 2018-11-01 ENCOUNTER — APPOINTMENT (OUTPATIENT)
Dept: GENERAL RADIOLOGY | Age: 78
DRG: 854 | End: 2018-11-01
Attending: EMERGENCY MEDICINE
Payer: MEDICARE

## 2018-11-01 ENCOUNTER — HOSPITAL ENCOUNTER (INPATIENT)
Age: 78
LOS: 3 days | Discharge: HOME HEALTH | DRG: 854 | End: 2018-11-05
Attending: EMERGENCY MEDICINE | Admitting: HOSPITALIST
Payer: MEDICARE

## 2018-11-01 ENCOUNTER — APPOINTMENT (OUTPATIENT)
Dept: CT IMAGING | Age: 78
DRG: 854 | End: 2018-11-01
Attending: EMERGENCY MEDICINE
Payer: MEDICARE

## 2018-11-01 DIAGNOSIS — I95.9 HYPOTENSION, UNSPECIFIED HYPOTENSION TYPE: ICD-10-CM

## 2018-11-01 DIAGNOSIS — R50.9 FEVER IN ADULT: Primary | ICD-10-CM

## 2018-11-01 DIAGNOSIS — Z98.890 S/P LUMBAR LAMINECTOMY: ICD-10-CM

## 2018-11-01 DIAGNOSIS — R55 VASOVAGAL NEAR SYNCOPE: ICD-10-CM

## 2018-11-01 PROBLEM — K27.9 PUD (PEPTIC ULCER DISEASE): Status: ACTIVE | Noted: 2018-11-01

## 2018-11-01 PROBLEM — Z98.1 S/P LAMINECTOMY WITH SPINAL FUSION: Status: ACTIVE | Noted: 2018-11-01

## 2018-11-01 PROBLEM — R65.10 SIRS (SYSTEMIC INFLAMMATORY RESPONSE SYNDROME) (HCC): Status: ACTIVE | Noted: 2018-11-01

## 2018-11-01 PROBLEM — E87.6 HYPOKALEMIA: Status: ACTIVE | Noted: 2018-11-01

## 2018-11-01 PROBLEM — K21.9 GERD (GASTROESOPHAGEAL REFLUX DISEASE): Status: ACTIVE | Noted: 2018-11-01

## 2018-11-01 LAB
ALBUMIN SERPL-MCNC: 2.7 G/DL (ref 3.4–5)
ALBUMIN/GLOB SERPL: 0.9 {RATIO} (ref 0.8–1.7)
ALP SERPL-CCNC: 143 U/L (ref 45–117)
ALT SERPL-CCNC: 30 U/L (ref 13–56)
ANION GAP SERPL CALC-SCNC: 10 MMOL/L (ref 3–18)
APPEARANCE UR: CLEAR
AST SERPL-CCNC: 42 U/L (ref 15–37)
BACTERIA URNS QL MICRO: ABNORMAL /HPF
BASOPHILS # BLD: 0 K/UL (ref 0–0.1)
BASOPHILS NFR BLD: 0 % (ref 0–3)
BILIRUB SERPL-MCNC: 0.8 MG/DL (ref 0.2–1)
BILIRUB UR QL: NEGATIVE
BUN SERPL-MCNC: 15 MG/DL (ref 7–18)
BUN/CREAT SERPL: 19
CALCIUM SERPL-MCNC: 7.9 MG/DL (ref 8.5–10.1)
CHLORIDE SERPL-SCNC: 99 MMOL/L (ref 100–108)
CK MB CFR SERPL CALC: 0.2 % (ref 0–4)
CK MB SERPL-MCNC: 1.1 NG/ML (ref 5–25)
CK SERPL-CCNC: 448 U/L (ref 26–192)
CO2 SERPL-SCNC: 26 MMOL/L (ref 21–32)
COLOR UR: ABNORMAL
CREAT SERPL-MCNC: 0.8 MG/DL (ref 0.6–1.3)
D DIMER PPP FEU-MCNC: 1.52 UG/ML(FEU)
DIFFERENTIAL METHOD BLD: ABNORMAL
EOSINOPHIL # BLD: 0.1 K/UL (ref 0–0.4)
EOSINOPHIL NFR BLD: 1 % (ref 0–5)
EPITH CASTS URNS QL MICRO: ABNORMAL /LPF (ref 0–5)
ERYTHROCYTE [DISTWIDTH] IN BLOOD BY AUTOMATED COUNT: 17.4 % (ref 11.6–14.5)
GLOBULIN SER CALC-MCNC: 3 G/DL (ref 2–4)
GLUCOSE SERPL-MCNC: 92 MG/DL (ref 74–99)
GLUCOSE UR STRIP.AUTO-MCNC: NEGATIVE MG/DL
HCT VFR BLD AUTO: 25.7 % (ref 35–45)
HGB BLD-MCNC: 8.2 G/DL (ref 12–16)
HGB UR QL STRIP: NEGATIVE
KETONES UR QL STRIP.AUTO: 80 MG/DL
LACTATE BLD-SCNC: 0.98 MMOL/L (ref 0.4–2)
LEUKOCYTE ESTERASE UR QL STRIP.AUTO: NEGATIVE
LYMPHOCYTES # BLD: 1 K/UL (ref 0.8–3.5)
LYMPHOCYTES NFR BLD: 7 % (ref 20–51)
MAGNESIUM SERPL-MCNC: 2.2 MG/DL (ref 1.6–2.6)
MCH RBC QN AUTO: 26.9 PG (ref 24–34)
MCHC RBC AUTO-ENTMCNC: 31.9 G/DL (ref 31–37)
MCV RBC AUTO: 84.3 FL (ref 74–97)
MONOCYTES # BLD: 1.8 K/UL (ref 0–1)
MONOCYTES NFR BLD: 13 % (ref 2–9)
MUCOUS THREADS URNS QL MICRO: ABNORMAL /LPF
NEUTS SEG # BLD: 11.2 K/UL (ref 1.8–8)
NEUTS SEG NFR BLD: 79 % (ref 42–75)
NITRITE UR QL STRIP.AUTO: NEGATIVE
PH UR STRIP: 6.5 [PH] (ref 5–8)
PLATELET # BLD AUTO: 211 K/UL (ref 135–420)
PMV BLD AUTO: 9.7 FL (ref 9.2–11.8)
POTASSIUM SERPL-SCNC: 3.3 MMOL/L (ref 3.5–5.5)
PROT SERPL-MCNC: 5.7 G/DL (ref 6.4–8.2)
PROT UR STRIP-MCNC: 30 MG/DL
RBC # BLD AUTO: 3.05 M/UL (ref 4.2–5.3)
RBC #/AREA URNS HPF: NEGATIVE /HPF (ref 0–5)
RBC MORPH BLD: ABNORMAL
SODIUM SERPL-SCNC: 135 MMOL/L (ref 136–145)
SP GR UR REFRACTOMETRY: 1.02 (ref 1–1.03)
TROPONIN I SERPL-MCNC: <0.02 NG/ML (ref 0–0.04)
UROBILINOGEN UR QL STRIP.AUTO: 1 EU/DL (ref 0.2–1)
WBC # BLD AUTO: 14.1 K/UL (ref 4.6–13.2)
WBC URNS QL MICRO: ABNORMAL /HPF (ref 0–5)

## 2018-11-01 PROCEDURE — 83605 ASSAY OF LACTIC ACID: CPT

## 2018-11-01 PROCEDURE — 71045 X-RAY EXAM CHEST 1 VIEW: CPT

## 2018-11-01 PROCEDURE — 81001 URINALYSIS AUTO W/SCOPE: CPT | Performed by: EMERGENCY MEDICINE

## 2018-11-01 PROCEDURE — 80053 COMPREHEN METABOLIC PANEL: CPT | Performed by: EMERGENCY MEDICINE

## 2018-11-01 PROCEDURE — 99284 EMERGENCY DEPT VISIT MOD MDM: CPT

## 2018-11-01 PROCEDURE — 96361 HYDRATE IV INFUSION ADD-ON: CPT

## 2018-11-01 PROCEDURE — 87086 URINE CULTURE/COLONY COUNT: CPT | Performed by: EMERGENCY MEDICINE

## 2018-11-01 PROCEDURE — 86140 C-REACTIVE PROTEIN: CPT | Performed by: HOSPITALIST

## 2018-11-01 PROCEDURE — 83735 ASSAY OF MAGNESIUM: CPT | Performed by: HOSPITALIST

## 2018-11-01 PROCEDURE — 85025 COMPLETE CBC W/AUTO DIFF WBC: CPT | Performed by: EMERGENCY MEDICINE

## 2018-11-01 PROCEDURE — 85652 RBC SED RATE AUTOMATED: CPT | Performed by: HOSPITALIST

## 2018-11-01 PROCEDURE — 93005 ELECTROCARDIOGRAM TRACING: CPT

## 2018-11-01 PROCEDURE — 85379 FIBRIN DEGRADATION QUANT: CPT | Performed by: HOSPITALIST

## 2018-11-01 PROCEDURE — 96375 TX/PRO/DX INJ NEW DRUG ADDON: CPT

## 2018-11-01 PROCEDURE — 82550 ASSAY OF CK (CPK): CPT | Performed by: EMERGENCY MEDICINE

## 2018-11-01 PROCEDURE — 74177 CT ABD & PELVIS W/CONTRAST: CPT

## 2018-11-01 PROCEDURE — 74011636320 HC RX REV CODE- 636/320: Performed by: EMERGENCY MEDICINE

## 2018-11-01 PROCEDURE — 87040 BLOOD CULTURE FOR BACTERIA: CPT | Performed by: EMERGENCY MEDICINE

## 2018-11-01 PROCEDURE — 96374 THER/PROPH/DIAG INJ IV PUSH: CPT

## 2018-11-01 PROCEDURE — 74011250637 HC RX REV CODE- 250/637: Performed by: EMERGENCY MEDICINE

## 2018-11-01 PROCEDURE — 74011250636 HC RX REV CODE- 250/636: Performed by: EMERGENCY MEDICINE

## 2018-11-01 RX ORDER — ONDANSETRON 2 MG/ML
4 INJECTION INTRAMUSCULAR; INTRAVENOUS ONCE
Status: COMPLETED | OUTPATIENT
Start: 2018-11-01 | End: 2018-11-01

## 2018-11-01 RX ORDER — MORPHINE SULFATE 4 MG/ML
4 INJECTION INTRAVENOUS ONCE
Status: COMPLETED | OUTPATIENT
Start: 2018-11-01 | End: 2018-11-01

## 2018-11-01 RX ORDER — ACETAMINOPHEN 500 MG
1000 TABLET ORAL
Status: COMPLETED | OUTPATIENT
Start: 2018-11-01 | End: 2018-11-01

## 2018-11-01 RX ADMIN — ACETAMINOPHEN 1000 MG: 500 TABLET ORAL at 20:39

## 2018-11-01 RX ADMIN — SODIUM CHLORIDE 1000 ML: 900 INJECTION, SOLUTION INTRAVENOUS at 17:36

## 2018-11-01 RX ADMIN — IOPAMIDOL 100 ML: 612 INJECTION, SOLUTION INTRAVENOUS at 19:31

## 2018-11-01 RX ADMIN — MORPHINE SULFATE 4 MG: 4 INJECTION INTRAVENOUS at 20:38

## 2018-11-01 RX ADMIN — ONDANSETRON 4 MG: 2 INJECTION INTRAMUSCULAR; INTRAVENOUS at 17:36

## 2018-11-01 NOTE — ED PROVIDER NOTES
EMERGENCY DEPARTMENT HISTORY AND PHYSICAL EXAM    Date: 11/1/2018  Patient Name: Hannah Rojas    History of Presenting Illness     Chief Complaint   Patient presents with    Post OP Complication         History Provided By: Patient    Chief Complaint: low back pain  Duration: 2 Days  Location: low back  Modifying Factors: secondary to surgery  Associated Symptoms: fever, cough, vomiting, dizziness, lightheadedness and generalized tremors    Additional History (Context):   5:11 PM   Hannah Rojas is a 70 y.o. female who presents to the emergency department C/O low back pain and low blood pressure s/p lumbar surgery performed by Matthew Henry. Karina Dai MD 2 days ago. She also had an episode of dizziness, lightheadedness and generalized tremors 1 hour ago. She notes BP of 87U systolic. Associated sxs include fever of 101 F, cough productive of clear phlegm (starting 2 months ago), and vomiting. Pt denies chest pain, shortness of breath, and any other sxs or complaints. PCP: JOSE Palacios    Current Outpatient Medications   Medication Sig Dispense Refill    diazePAM (VALIUM) 5 mg tablet Take 0.5 Tabs by mouth three (3) times daily as needed. Max Daily Amount: 7.5 mg. 60 Tab 0    HYDROcodone-acetaminophen (NORCO) 5-325 mg per tablet Take 1-1.5 Tabs by mouth every four (4) hours as needed. Max Daily Amount: 9 Tabs. 84 Tab 0    lubiPROStone (AMITIZA) 24 mcg capsule Take 1 Cap by mouth two (2) times daily (with meals) for 30 days. 60 Cap 0    naloxone (NARCAN) 4 mg/actuation nasal spray Use 1 spray intranasally, then discard. Repeat with new spray every 2 min as needed for opioid overdose symptoms, alternating nostrils. 1 Each 0    metoprolol succinate (TOPROL-XL) 25 mg XL tablet Take 25 mg by mouth nightly.  clobetasol (CLOBEX) 0.05 % lotion Apply  to affected area two (2) times a day.  dupilumab (DUPIXENT) 300 mg/2 mL syrg syringe 300 mg by SubCUTAneous route every fourteen (14) days.  lisinopril (PRINIVIL, ZESTRIL) 5 mg tablet Take 2.5 mg by mouth daily.  simvastatin (ZOCOR) 20 mg tablet Take  by mouth nightly.  zoledronic acid (RECLAST) 5 mg/100 mL pgbk 5 mg by IntraVENous route once.  ondansetron (ZOFRAN ODT) 4 mg disintegrating tablet Take 1 Tab by mouth every eight (8) hours as needed for Nausea. 6 Tab 0    Hydrochlorothiazide 12.5 mg tablet Take 12.5 mg by mouth daily.  omeprazole (PRILOSEC) 40 mg capsule Take 40 mg by mouth two (2) times a day.  Magnesium Oxide 500 mg cap Take 400 mg by mouth daily.  atorvastatin (LIPITOR) 20 mg tablet Take 20 mg by mouth daily.  LORazepam (ATIVAN) 0.5 mg tablet Take 0.5 mg by mouth nightly as needed for Anxiety. Past History     Past Medical History:  Past Medical History:   Diagnosis Date    Acid reflux     Arthritis     GERD (gastroesophageal reflux disease)     Hypertension 2004    Ill-defined condition     \"lichen plautus per paperwork    Lichen planus     PUD (peptic ulcer disease)     Unspecified adverse effect of anesthesia     itching       Past Surgical History:  Past Surgical History:   Procedure Laterality Date    HX HEENT      sinus surgery    HX ORTHOPAEDIC  1964    right femur fracture pin    HX ORTHOPAEDIC  1965    bone graft right femur    HX ORTHOPAEDIC  1968    right femur pin removal    HX ORTHOPAEDIC  1990    maura bunnion feet surgery    HX ORTHOPAEDIC  1996    right hip replacement    HX ORTHOPAEDIC  2004    right knee replacement    HX ORTHOPAEDIC  2008    carpal tunnel left    HX ORTHOPAEDIC  2008    cervical surgery    HX ORTHOPAEDIC      shoulder replacement       Family History:  History reviewed. No pertinent family history. Social History:  Social History     Tobacco Use    Smoking status: Never Smoker    Smokeless tobacco: Never Used   Substance Use Topics    Alcohol use: No    Drug use: No       Allergies:   Allergies   Allergen Reactions   Berenice Jerez [Diphenhydramine Hcl] Other (comments)     \"climbs the wall\"    Phenergan [Promethazine] Other (comments)     \"climbs the wall\"         Review of Systems   Review of Systems   Constitutional: Positive for fever (101 F). Negative for activity change, appetite change and unexpected weight change. HENT: Negative for congestion and sore throat. Eyes: Negative for pain and redness. Respiratory: Positive for cough (clear phlegm). Negative for shortness of breath. Cardiovascular: Negative for chest pain and palpitations. Gastrointestinal: Positive for abdominal pain, nausea and vomiting. Negative for diarrhea. Endocrine: Negative for polydipsia and polyuria. Genitourinary: Negative for difficulty urinating and dysuria. Musculoskeletal: Positive for back pain (lower). Negative for neck pain. Skin: Negative for pallor and rash. Neurological: Positive for dizziness, tremors (generalized tremors) and light-headedness. Negative for headaches. Physical Exam     Vitals:    11/01/18 1634 11/01/18 1902 11/01/18 2038 11/01/18 2257   BP: 99/42 127/61 134/53    Pulse: 86 95 91    Resp: 16 15 16    Temp: (!) 101.1 °F (38.4 °C)  (!) 103 °F (39.4 °C) 100.1 °F (37.8 °C)   SpO2: 99% 97% 99%    Weight: 58.1 kg (128 lb)      Height: 5' 2\" (1.575 m)        Physical Exam   Constitutional: She is oriented to person, place, and time. She appears well-developed and well-nourished. HENT:   Head: Normocephalic and atraumatic. Right Ear: External ear normal.   Left Ear: External ear normal.   Nose: Nose normal.   Mouth/Throat: Oropharynx is clear and moist.   Eyes: Conjunctivae and EOM are normal. Pupils are equal, round, and reactive to light. Neck: Normal range of motion. Neck supple. No JVD present. No tracheal deviation present. Cardiovascular: Normal rate, regular rhythm, normal heart sounds and intact distal pulses. Exam reveals no gallop and no friction rub. No murmur heard.   Pulmonary/Chest: Effort normal and breath sounds normal. No respiratory distress. She has no wheezes. She has no rales. Frequent cough   Abdominal: Soft. Bowel sounds are normal. She exhibits no distension and no mass. There is no tenderness. There is no rebound and no guarding. Musculoskeletal: Normal range of motion. She exhibits no edema or tenderness. Neurological: She is alert and oriented to person, place, and time. She has normal reflexes. No cranial nerve deficit. Skin: Skin is warm and dry. No rash noted. Surgical scar to lumbar spine noted to be intact with no drainage, or signs of infection   Psychiatric: She has a normal mood and affect. Her behavior is normal.   Nursing note and vitals reviewed.         Diagnostic Study Results     Labs -     Recent Results (from the past 12 hour(s))   URINALYSIS W/ RFLX MICROSCOPIC    Collection Time: 11/01/18  5:15 PM   Result Value Ref Range    Color DARK YELLOW      Appearance CLEAR      Specific gravity 1.019 1.005 - 1.030      pH (UA) 6.5 5.0 - 8.0      Protein 30 (A) NEG mg/dL    Glucose NEGATIVE  NEG mg/dL    Ketone 80 (A) NEG mg/dL    Bilirubin NEGATIVE  NEG      Blood NEGATIVE  NEG      Urobilinogen 1.0 0.2 - 1.0 EU/dL    Nitrites NEGATIVE  NEG      Leukocyte Esterase NEGATIVE  NEG     URINE MICROSCOPIC ONLY    Collection Time: 11/01/18  5:15 PM   Result Value Ref Range    WBC 0 to 2 0 - 5 /hpf    RBC NEGATIVE  0 - 5 /hpf    Epithelial cells FEW 0 - 5 /lpf    Bacteria FEW (A) NEG /hpf    Mucus FEW (A) NEG /lpf   CBC WITH AUTOMATED DIFF    Collection Time: 11/01/18  5:20 PM   Result Value Ref Range    WBC 14.1 (H) 4.6 - 13.2 K/uL    RBC 3.05 (L) 4.20 - 5.30 M/uL    HGB 8.2 (L) 12.0 - 16.0 g/dL    HCT 25.7 (L) 35.0 - 45.0 %    MCV 84.3 74.0 - 97.0 FL    MCH 26.9 24.0 - 34.0 PG    MCHC 31.9 31.0 - 37.0 g/dL    RDW 17.4 (H) 11.6 - 14.5 %    PLATELET 201 668 - 002 K/uL    MPV 9.7 9.2 - 11.8 FL    NEUTROPHILS 79 (H) 42 - 75 %    LYMPHOCYTES 7 (L) 20 - 51 %    MONOCYTES 13 (H) 2 - 9 %    EOSINOPHILS 1 0 - 5 %    BASOPHILS 0 0 - 3 %    ABS. NEUTROPHILS 11.2 (H) 1.8 - 8.0 K/UL    ABS. LYMPHOCYTES 1.0 0.8 - 3.5 K/UL    ABS. MONOCYTES 1.8 (H) 0 - 1.0 K/UL    ABS. EOSINOPHILS 0.1 0.0 - 0.4 K/UL    ABS. BASOPHILS 0.0 0.0 - 0.1 K/UL    RBC COMMENTS ANISOCYTOSIS  1+        DF MANUAL     METABOLIC PANEL, COMPREHENSIVE    Collection Time: 11/01/18  5:20 PM   Result Value Ref Range    Sodium 135 (L) 136 - 145 mmol/L    Potassium 3.3 (L) 3.5 - 5.5 mmol/L    Chloride 99 (L) 100 - 108 mmol/L    CO2 26 21 - 32 mmol/L    Anion gap 10 3.0 - 18 mmol/L    Glucose 92 74 - 99 mg/dL    BUN 15 7.0 - 18 MG/DL    Creatinine 0.80 0.6 - 1.3 MG/DL    BUN/Creatinine ratio 19      GFR est AA >60 >60 ml/min/1.73m2    GFR est non-AA >60 >60 ml/min/1.73m2    Calcium 7.9 (L) 8.5 - 10.1 MG/DL    Bilirubin, total 0.8 0.2 - 1.0 MG/DL    ALT (SGPT) 30 13 - 56 U/L    AST (SGOT) 42 (H) 15 - 37 U/L    Alk.  phosphatase 143 (H) 45 - 117 U/L    Protein, total 5.7 (L) 6.4 - 8.2 g/dL    Albumin 2.7 (L) 3.4 - 5.0 g/dL    Globulin 3.0 2.0 - 4.0 g/dL    A-G Ratio 0.9 0.8 - 1.7     CARDIAC PANEL,(CK, CKMB & TROPONIN)    Collection Time: 11/01/18  5:20 PM   Result Value Ref Range     (H) 26 - 192 U/L    CK - MB 1.1 <3.6 ng/ml    CK-MB Index 0.2 0.0 - 4.0 %    Troponin-I, Qt. <0.02 0.0 - 0.045 NG/ML   EKG, 12 LEAD, INITIAL    Collection Time: 11/01/18  5:22 PM   Result Value Ref Range    Ventricular Rate 91 BPM    Atrial Rate 91 BPM    P-R Interval 162 ms    QRS Duration 142 ms    Q-T Interval 410 ms    QTC Calculation (Bezet) 504 ms    Calculated P Axis 49 degrees    Calculated R Axis 48 degrees    Calculated T Axis 92 degrees    Diagnosis       Normal sinus rhythm  Left bundle branch block  Abnormal ECG  When compared with ECG of 30-SEP-2018 16:45,  No significant change was found     POC LACTIC ACID    Collection Time: 11/01/18  5:29 PM   Result Value Ref Range    Lactic Acid (POC) 0.98 0.40 - 2.00 mmol/L       Radiologic Studies - CT ABD PELV W CONT   XR CHEST PORT    (Results Pending)     11:55 PM  RADIOLOGY FINDINGS  Chest X-ray shows no infiltrate or edema  Pending review by Radiologist  Recorded by NAOMIE Cobb, as dictated by Dre. Tash Aguilar MD    CT Results  (Last 48 hours)               11/01/18 1940  CT ABD PELV W CONT Final result    Impression:  IMPRESSION:       There are postoperative changes from laminectomy and posterior fusion of the   lumbar spine. Evaluation for epidural abscess or fluid collection is limited on   CT. If that is of concern then I would recommend MRI for further evaluation. Gas in the urinary bladder which may reflect instrumentation versus   emphysematous cystitis       Small hiatal hernia       Colonic diverticulosis       Narrative:  EXAM: CT of the abdomen and pelvis       INDICATION: Postop back surgery fever       COMPARISON: September 30, 2018       TECHNIQUE: Axial CT imaging of the abdomen and pelvis was performed with   intravenous contrast. Multiplanar reformats were generated. One or more dose   reduction techniques were used on this CT: automated exposure control,   adjustment of the mAs and/or kVp according to patient size, and iterative   reconstruction techniques. The specific techniques used on this CT exam have   been documented in the patient's electronic medical record.       _______________       FINDINGS:       LOWER CHEST: Minimal right and left pleural fluid seen. Lung bases are clear. There is a small hiatal hernia. LIVER, BILIARY: There is a 7 mm low-attenuation in the right hepatic lobe   suggesting a small cyst. No biliary dilation. Gallbladder is unremarkable. PANCREAS: Normal.       SPLEEN: Normal.       ADRENALS: Normal.       KIDNEYS: There is a 3.4 cm cyst the midpole the left kidney. Other smaller cysts   are seen in the left kidney. There are small cysts in the upper pole the right   kidney and midpole the right kidney.  There is a 4 mm nonobstructive calculus in   the midpole the right kidney. Parapelvic cysts are seen in the left kidney. Kidneys demonstrate no hydronephrosis or hydroureter. VASCULATURE: Mild calcific atherosclerosis present. LYMPH NODES: No enlarged lymph nodes. GASTROINTESTINAL TRACT: No bowel dilation or wall thickening. Small hiatal   hernia present. There is no bowel obstruction. Appendix is not visualized. Mild   colonic diverticulosis present. PELVIC ORGANS: There are beam hardening artifacts from right hip arthroplasty   limiting evaluation the pelvis. Uterus is seen. Minimal free fluid is present in   the right hemipelvis. There is gas in the urinary bladder which may reflect   instrumentation versus emphysematous cystitis. BONES: No acute or aggressive osseous abnormalities identified. There is   osteopenia. There are posterior pedicular screws and rods from level of L4-S1. There is grade 1 anterolisthesis at L4-L5 likely degenerative. There is no   loosening of the hardware. Endplate discogenic changes seen at L2-L3. There are   postlaminectomy changes at L4-L5. And staples are present. There is stranding of   the subcutaneous tissues of the back at the operative site likely postsurgical.   Please note evaluation for epidural abscess or fluid collection is limited on   CT. If that is of concern then I would recommend MRI for further evaluation.        OTHER: None.       _______________               CXR Results  (Last 48 hours)    None          Medications given in the ED-  Medications   sodium chloride 0.9 % bolus infusion 1,000 mL (0 mL IntraVENous IV Completed 11/1/18 1900)   ondansetron (ZOFRAN) injection 4 mg (4 mg IntraVENous Given 11/1/18 1736)   iopamidol (ISOVUE 300) 61 % contrast injection 100 mL (100 mL IntraVENous Given 11/1/18 1931)   morphine injection 4 mg (4 mg IntraVENous Given 11/1/18 2038)   acetaminophen (TYLENOL) tablet 1,000 mg (1,000 mg Oral Given 11/1/18 2039) Medical Decision Making   I am the first provider for this patient. I reviewed the vital signs, available nursing notes, past medical history, past surgical history, family history and social history. Vital Signs-Reviewed the patient's vital signs. Pulse Oximetry Analysis - 99% on room air     EKG interpretation: (Preliminary)  5:22 PM   NSR. Rate 91 bpm. Left BBB  EKG read by Katelyn Ambrocio MD at 5:27 PM     Records Reviewed: Nursing Notes and Old Medical Records    Provider Notes (Medical Decision Making): INITIAL CLINICAL IMPRESSION and PLANS:  The patient presents with the primary complaint(s) of: back pain, fever. The presentation, to include historical aspects and clinical findings are consistent with the DX of sepsis. However, other possible DX's to consider as primary, associated with, or exacerbated by include:    1. Post op fever  2. Pneumonia  3. Wound infection  4. UTI  5. Atelectasis  6. PE    Considering the above, my initial management plan to evaluate and therapeutic interventions include the following and as noted in the orders:    1. Labs: Plasma lactic acid, Blood culture, UA, CMP, CBC, POC Lactic acid, Cardiac Panel  2. Imaging: EKG, CT abdomen    Procedures:  Procedures    ED Course:   5:11 PM Initial assessment performed. The patients presenting problems have been discussed, and they are in agreement with the care plan formulated and outlined with them. I have encouraged them to ask questions as they arise throughout their visit. SIGN OUT:  7:20 PM  Patient's presentation, labs/imaging and plan of care was reviewed with Andres Mccullough. Edward Pulido MD as part of sign out. They will review CT results and labs as part of the plan discussed with the patient. Andres Mccullough. Edward Pulido MD's assistance in completion of this plan is greatly appreciated but it should be noted that I will be the provider of record for this patient.   Written by Mary Rashid, NAOMIE Scribe for Bijan Jay Jasmine Nunn MD     11:24 PM Discussed patient's history, exam, and available diagnostics results with Thomas Cosby MD, internal medicine, who wants us to talk to ortho and will watch overnight. 11:40 PM Discussed patient's history, exam, and available diagnostics results with Lakisha Fields MD, orthopedics who adamantly disagrees that fever is caused by anything other than atelectasis. We suggested possible MRI, he states it is not indicated. We would ask medicine to watch overnight for vasovagal episodes and low BP. She does not meet sepsis. Diagnosis and Disposition       Critical Care Time: None    Core Measures:  For Hospitalized Patients:    1. Hospitalization Decision Time:  The decision to hospitalize the patient was made by Heather Butler. Samson Chairez MD at [facility]. Samson Chairez MD on 11/1/2018    2. Aspirin: Aspirin was not given because the patient did not present with a stroke at the time of their Emergency Department evaluation    11:38 PM  Patient is being admitted to the hospital by Thomas Cosby MD. The results of their tests and reasons for their admission have been discussed with them and/or available family. They convey agreement and understanding for the need to be admitted and for their admission diagnosis. CONDITIONS ON ADMISSION:  Sepsis is not present at the time of admission. Deep Vein Thrombosis is not present at the time of admission. Thrombosis is not present at the time of admission. Urinary Tract Infection is not present at the time of admission. Pneumonia is not present at the time of admission. MRSA is not present at the time of admission. Wound infection is not present at the time of admission. Pressure Ulcer is not present at the time of admission. CLINICAL IMPRESSION:    1. Fever in adult    2. Vasovagal near syncope    3. Hypotension, unspecified hypotension type    4. S/P lumbar laminectomy         PLAN:  1. Admit to tele obs  _______________________________    Attestations:   This note is prepared by Hilda Arias, acting as Scribe for Evan Flores MD.    Evan Flores MD:  The scribe's documentation has been prepared under my direction and personally reviewed by me in its entirety. I confirm that the note above accurately reflects all work, treatment, procedures, and medical decision making performed by me. This note is prepared by Jocelyne Llamas, acting as Scribe for Betty Mccray. Carin Sommers MD.    Betty Mccray. Carin Sommers MD: The scribe's documentation has been prepared under my direction and personally reviewed by me in its entirety.  I confirm that the note above accurately reflects all work, treatment, procedures, and medical decision making performed by me.    _______________________________

## 2018-11-01 NOTE — ED TRIAGE NOTES
Pt states low back surgery on Tuesday, pt states discharged home yest. Pt c/o pain, vomiting, low BP, pt states low bp noted in hospital prior to discharge.  Pt c/o being dizzy lightheaded, pt presents with a dry cough

## 2018-11-02 ENCOUNTER — APPOINTMENT (OUTPATIENT)
Dept: CT IMAGING | Age: 78
DRG: 854 | End: 2018-11-02
Attending: HOSPITALIST
Payer: MEDICARE

## 2018-11-02 ENCOUNTER — HOME HEALTH ADMISSION (OUTPATIENT)
Dept: HOME HEALTH SERVICES | Facility: HOME HEALTH | Age: 78
End: 2018-11-02
Payer: MEDICARE

## 2018-11-02 ENCOUNTER — APPOINTMENT (OUTPATIENT)
Dept: VASCULAR SURGERY | Age: 78
DRG: 854 | End: 2018-11-02
Attending: HOSPITALIST
Payer: MEDICARE

## 2018-11-02 PROBLEM — R79.89 POSITIVE D DIMER: Status: ACTIVE | Noted: 2018-11-02

## 2018-11-02 PROBLEM — K59.00 CONSTIPATION: Status: ACTIVE | Noted: 2018-11-02

## 2018-11-02 PROBLEM — M79.605 PAIN OF LEFT LOWER EXTREMITY: Status: ACTIVE | Noted: 2018-11-02

## 2018-11-02 PROBLEM — D62 ANEMIA DUE TO ACUTE BLOOD LOSS: Status: ACTIVE | Noted: 2018-11-02

## 2018-11-02 LAB
CRP SERPL-MCNC: 16.7 MG/DL (ref 0–0.3)
ERYTHROCYTE [SEDIMENTATION RATE] IN BLOOD: >140 MM/HR (ref 0–30)

## 2018-11-02 PROCEDURE — 77030011256 HC DRSG MEPILEX <16IN NO BORD MOLN -A

## 2018-11-02 PROCEDURE — 97166 OT EVAL MOD COMPLEX 45 MIN: CPT

## 2018-11-02 PROCEDURE — 74011250637 HC RX REV CODE- 250/637: Performed by: PHYSICIAN ASSISTANT

## 2018-11-02 PROCEDURE — 93970 EXTREMITY STUDY: CPT

## 2018-11-02 PROCEDURE — 97535 SELF CARE MNGMENT TRAINING: CPT

## 2018-11-02 PROCEDURE — 71275 CT ANGIOGRAPHY CHEST: CPT

## 2018-11-02 PROCEDURE — 99218 HC RM OBSERVATION: CPT

## 2018-11-02 PROCEDURE — 74011250637 HC RX REV CODE- 250/637: Performed by: HOSPITALIST

## 2018-11-02 PROCEDURE — 77030032490 HC SLV COMPR SCD KNE COVD -B

## 2018-11-02 PROCEDURE — 97161 PT EVAL LOW COMPLEX 20 MIN: CPT

## 2018-11-02 PROCEDURE — 97530 THERAPEUTIC ACTIVITIES: CPT

## 2018-11-02 PROCEDURE — 77030037875 HC DRSG MEPILEX <16IN BORD MOLN -A

## 2018-11-02 PROCEDURE — 65660000000 HC RM CCU STEPDOWN

## 2018-11-02 PROCEDURE — 74011000258 HC RX REV CODE- 258: Performed by: HOSPITALIST

## 2018-11-02 PROCEDURE — 74011636320 HC RX REV CODE- 636/320: Performed by: HOSPITALIST

## 2018-11-02 PROCEDURE — 74011000250 HC RX REV CODE- 250: Performed by: HOSPITALIST

## 2018-11-02 PROCEDURE — 74011250636 HC RX REV CODE- 250/636: Performed by: HOSPITALIST

## 2018-11-02 RX ORDER — NALOXONE HYDROCHLORIDE 0.4 MG/ML
0.4 INJECTION, SOLUTION INTRAMUSCULAR; INTRAVENOUS; SUBCUTANEOUS AS NEEDED
Status: DISCONTINUED | OUTPATIENT
Start: 2018-11-02 | End: 2018-11-05 | Stop reason: HOSPADM

## 2018-11-02 RX ORDER — KETOROLAC TROMETHAMINE 30 MG/ML
15 INJECTION, SOLUTION INTRAMUSCULAR; INTRAVENOUS
Status: DISCONTINUED | OUTPATIENT
Start: 2018-11-02 | End: 2018-11-05 | Stop reason: HOSPADM

## 2018-11-02 RX ORDER — HYDROCODONE BITARTRATE AND ACETAMINOPHEN 5; 325 MG/1; MG/1
1-1.5 TABLET ORAL
Status: DISCONTINUED | OUTPATIENT
Start: 2018-11-02 | End: 2018-11-05 | Stop reason: HOSPADM

## 2018-11-02 RX ORDER — HEPARIN SODIUM 5000 [USP'U]/ML
5000 INJECTION, SOLUTION INTRAVENOUS; SUBCUTANEOUS EVERY 8 HOURS
Status: DISCONTINUED | OUTPATIENT
Start: 2018-11-02 | End: 2018-11-02

## 2018-11-02 RX ORDER — POLYETHYLENE GLYCOL 3350 17 G/17G
17 POWDER, FOR SOLUTION ORAL 2 TIMES DAILY
Status: DISCONTINUED | OUTPATIENT
Start: 2018-11-02 | End: 2018-11-05 | Stop reason: HOSPADM

## 2018-11-02 RX ORDER — PANTOPRAZOLE SODIUM 40 MG/1
40 TABLET, DELAYED RELEASE ORAL DAILY
Status: DISCONTINUED | OUTPATIENT
Start: 2018-11-02 | End: 2018-11-05 | Stop reason: HOSPADM

## 2018-11-02 RX ORDER — ONDANSETRON 2 MG/ML
4 INJECTION INTRAMUSCULAR; INTRAVENOUS
Status: DISCONTINUED | OUTPATIENT
Start: 2018-11-02 | End: 2018-11-05 | Stop reason: HOSPADM

## 2018-11-02 RX ORDER — SIMETHICONE 80 MG
80 TABLET,CHEWABLE ORAL
Status: DISCONTINUED | OUTPATIENT
Start: 2018-11-02 | End: 2018-11-05 | Stop reason: HOSPADM

## 2018-11-02 RX ORDER — DOCUSATE SODIUM 100 MG/1
100 CAPSULE, LIQUID FILLED ORAL 2 TIMES DAILY
Status: DISCONTINUED | OUTPATIENT
Start: 2018-11-02 | End: 2018-11-05 | Stop reason: HOSPADM

## 2018-11-02 RX ORDER — MAGNESIUM CITRATE
296 SOLUTION, ORAL ORAL
Status: COMPLETED | OUTPATIENT
Start: 2018-11-02 | End: 2018-11-02

## 2018-11-02 RX ORDER — POTASSIUM CHLORIDE 20 MEQ/1
40 TABLET, EXTENDED RELEASE ORAL
Status: COMPLETED | OUTPATIENT
Start: 2018-11-02 | End: 2018-11-02

## 2018-11-02 RX ORDER — LUBIPROSTONE 24 UG/1
24 CAPSULE, GELATIN COATED ORAL 2 TIMES DAILY WITH MEALS
Status: DISCONTINUED | OUTPATIENT
Start: 2018-11-02 | End: 2018-11-05 | Stop reason: HOSPADM

## 2018-11-02 RX ORDER — VANCOMYCIN/0.9 % SOD CHLORIDE 1.5G/250ML
1500 PLASTIC BAG, INJECTION (ML) INTRAVENOUS ONCE
Status: COMPLETED | OUTPATIENT
Start: 2018-11-02 | End: 2018-11-02

## 2018-11-02 RX ORDER — ACETAMINOPHEN 325 MG/1
650 TABLET ORAL
Status: DISCONTINUED | OUTPATIENT
Start: 2018-11-02 | End: 2018-11-05 | Stop reason: HOSPADM

## 2018-11-02 RX ORDER — ATORVASTATIN CALCIUM 20 MG/1
20 TABLET, FILM COATED ORAL DAILY
Status: DISCONTINUED | OUTPATIENT
Start: 2018-11-02 | End: 2018-11-05 | Stop reason: HOSPADM

## 2018-11-02 RX ORDER — SODIUM CHLORIDE 9 MG/ML
100 INJECTION, SOLUTION INTRAVENOUS CONTINUOUS
Status: DISPENSED | OUTPATIENT
Start: 2018-11-02 | End: 2018-11-03

## 2018-11-02 RX ADMIN — ATORVASTATIN CALCIUM 20 MG: 20 TABLET, FILM COATED ORAL at 10:17

## 2018-11-02 RX ADMIN — PIPERACILLIN SODIUM,TAZOBACTAM SODIUM 3.38 G: 3; .375 INJECTION, POWDER, FOR SOLUTION INTRAVENOUS at 18:11

## 2018-11-02 RX ADMIN — PANTOPRAZOLE SODIUM 40 MG: 40 TABLET, DELAYED RELEASE ORAL at 10:17

## 2018-11-02 RX ADMIN — PIPERACILLIN SODIUM,TAZOBACTAM SODIUM 3.38 G: 3; .375 INJECTION, POWDER, FOR SOLUTION INTRAVENOUS at 01:48

## 2018-11-02 RX ADMIN — SODIUM CHLORIDE 100 ML/HR: 900 INJECTION, SOLUTION INTRAVENOUS at 01:49

## 2018-11-02 RX ADMIN — ONDANSETRON 4 MG: 2 INJECTION INTRAMUSCULAR; INTRAVENOUS at 10:22

## 2018-11-02 RX ADMIN — DOCUSATE SODIUM 100 MG: 100 CAPSULE, LIQUID FILLED ORAL at 01:48

## 2018-11-02 RX ADMIN — KETOROLAC TROMETHAMINE 15 MG: 30 INJECTION, SOLUTION INTRAMUSCULAR at 07:51

## 2018-11-02 RX ADMIN — HYDROCODONE BITARTRATE AND ACETAMINOPHEN 1 TABLET: 5; 325 TABLET ORAL at 18:11

## 2018-11-02 RX ADMIN — SIMETHICONE CHEW TAB 80 MG 80 MG: 80 TABLET ORAL at 22:34

## 2018-11-02 RX ADMIN — DOCUSATE SODIUM 100 MG: 100 CAPSULE, LIQUID FILLED ORAL at 10:18

## 2018-11-02 RX ADMIN — IOPAMIDOL 100 ML: 755 INJECTION, SOLUTION INTRAVENOUS at 02:31

## 2018-11-02 RX ADMIN — MAGESIUM CITRATE 296 ML: 1.75 LIQUID ORAL at 03:03

## 2018-11-02 RX ADMIN — PIPERACILLIN SODIUM,TAZOBACTAM SODIUM 3.38 G: 3; .375 INJECTION, POWDER, FOR SOLUTION INTRAVENOUS at 13:30

## 2018-11-02 RX ADMIN — LUBIPROSTONE 24 MCG: 24 CAPSULE, GELATIN COATED ORAL at 10:17

## 2018-11-02 RX ADMIN — VANCOMYCIN HYDROCHLORIDE 1500 MG: 10 INJECTION, POWDER, LYOPHILIZED, FOR SOLUTION INTRAVENOUS at 03:04

## 2018-11-02 RX ADMIN — HYDROCODONE BITARTRATE AND ACETAMINOPHEN 1 TABLET: 5; 325 TABLET ORAL at 22:34

## 2018-11-02 RX ADMIN — ONDANSETRON 4 MG: 2 INJECTION INTRAMUSCULAR; INTRAVENOUS at 04:59

## 2018-11-02 RX ADMIN — SIMETHICONE CHEW TAB 80 MG 80 MG: 80 TABLET ORAL at 13:24

## 2018-11-02 RX ADMIN — POTASSIUM CHLORIDE 40 MEQ: 20 TABLET, EXTENDED RELEASE ORAL at 01:48

## 2018-11-02 RX ADMIN — LUBIPROSTONE 24 MCG: 24 CAPSULE, GELATIN COATED ORAL at 18:11

## 2018-11-02 RX ADMIN — MULTIPLE VITAMINS W/ MINERALS TAB 1 TABLET: TAB at 10:17

## 2018-11-02 RX ADMIN — HYDROCODONE BITARTRATE AND ACETAMINOPHEN 1 TABLET: 5; 325 TABLET ORAL at 04:58

## 2018-11-02 RX ADMIN — PIPERACILLIN SODIUM,TAZOBACTAM SODIUM 3.38 G: 3; .375 INJECTION, POWDER, FOR SOLUTION INTRAVENOUS at 07:14

## 2018-11-02 RX ADMIN — HYDROCODONE BITARTRATE AND ACETAMINOPHEN 1 TABLET: 5; 325 TABLET ORAL at 10:22

## 2018-11-02 NOTE — PROGRESS NOTES
Progress Note POD #      Patient: Pedro Payan               Sex: female          DOA: 11/1/2018         YOB: 1940      Surgery:            LOS: 0 days               Subjective:     No new complaints    Objective:      Visit Vitals  /44 (BP 1 Location: Left arm, BP Patient Position: At rest)   Pulse 82   Temp 98.7 °F (37.1 °C)   Resp 18   Ht 5' 2\" (1.575 m)   Wt 64.5 kg (142 lb 3.2 oz)   SpO2 95%   BMI 26.01 kg/m²       Physical Exam:  Neurological: motor strength: 5/5 in lower extremities bilaterally                          sensation: intact to light touch  Wound Clean/Dry/intact. No tenderness  Left hamstring tenderness at ishium    Intake and Output:  Current Shift:  No intake/output data recorded. Last three shifts:  10/31 1901 - 11/02 0700  In: 1608.3 [I.V.:1608.3]  Out: 450 [Urine:250]    Lab/Data Reviewed:    Lab/Data Reviewed:  Lab Results   Component Value Date/Time    WBC 14.1 (H) 11/01/2018 05:20 PM    HGB 8.2 (L) 11/01/2018 05:20 PM    HCT 25.7 (L) 11/01/2018 05:20 PM    PLATELET 525 18/42/8897 05:20 PM    MCV 84.3 11/01/2018 05:20 PM     Lab Results   Component Value Date/Time    aPTT 25.5 02/28/2014 03:07 PM     Lab Results   Component Value Date/Time    INR 1.0 03/11/2014 02:10 AM    INR 1.0 02/28/2014 03:07 PM    Prothrombin time 12.1 03/11/2014 02:10 AM    Prothrombin time 12.5 02/28/2014 03:07 PM            Assessment/Plan     Principal Problem:    SIRS (systemic inflammatory response syndrome) ([facility] [address] (11/1/2018)    Active Problems:    Fever (11/1/2018)      GERD (gastroesophageal reflux disease) (11/1/2018)      PUD (peptic ulcer disease) (11/1/2018)      Hypokalemia (11/1/2018)      Hypotension (11/1/2018)      S/P laminectomy with spinal fusion (11/1/2018)      Fever in adult (11/1/2018)      Constipation (11/2/2018)      Anemia due to acute blood loss (11/2/2018)      Positive D dimer (11/2/2018)      Pain of left lower extremity (11/2/2018)        1.  Stable orthpaedically, no signs of wound infection  2. OOB with PT  3. Temp and blood pressure responding with fluids  4. Left hamstring irritation will improve with ambulation   5.  D/C Planning per Medicine

## 2018-11-02 NOTE — PROGRESS NOTES
Hospitalist Progress Note    Patient: Hernando Doyle MRN: [id number]  CSN: [id number]    YOB: 1940  Age: 70 y.o. Sex: female    DOA: 11/1/2018 LOS:  LOS: 0 days          Chief Complaint:    Fever    Assessment/Plan     1. SIRS  2. Hypotension  3. Acute Blood Loss Anemia  4. Constipation  5. Hypokalemia  6. GERD  7. S/P Laminectomy   8. Leg Pain    1. Patient remains afebrile since admission, BP stable. Continue empiric antibiotic coverage. Seen by orthopedics. Lumbar incisions are clean. Continue to monitor. If patient develops fever, would obtain MRI lumbar spine to evaluate for abscess. 2. Resolved, BP stable. Continue to monitor over her hospitalization. 3. Stable since 10/31. Monitor closely, no indication for transfusion at this juncture. 4. Continue bowel regimen. 5. K replacement. Monitor with daily labs. 6. PPI. 7. Seen by orthopedics. Stable per ortho. As above, if continued fever would get MRI lumbar spine to eval for abscess. Continue PT. 8. LE duplex done, no DVT.      DVT Prophylaxis - SCDs  Disposition - TBD    Patient Active Problem List   Diagnosis Code    History of total hip arthroplasty Z96.649    DDD (degenerative disc disease), lumbar M51.36    Fever R50.9    SIRS (systemic inflammatory response syndrome) (HCC) R65.10    GERD (gastroesophageal reflux disease) K21.9    PUD (peptic ulcer disease) K27.9    Hypokalemia E87.6    Hypotension I95.9    S/P laminectomy with spinal fusion Z98.1    Fever in adult R50.9    Constipation K59.00    Anemia due to acute blood loss D62    Positive D dimer R79.89    Pain of left lower extremity M79.605       Subjective:    Feeling better since admission  Having some gas pain  Feels that she needs to have BM    Review of systems:    Constitutional: denies fevers, chills, myalgias  Respiratory: denies SOB, cough  Cardiovascular: denies chest pain, palpitations  Gastrointestinal: denies nausea, vomiting, diarrhea      Vital signs/Intake and Output:  Visit Vitals  /49 (BP 1 Location: Right arm, BP Patient Position: At rest)   Pulse 78   Temp 98.6 °F (37 °C)   Resp 18   Ht 5' 2\" (1.575 m)   Wt 64.5 kg (142 lb 3.2 oz)   SpO2 99%   BMI 26.01 kg/m²     Current Shift:  No intake/output data recorded. Last three shifts:  10/31 1901 - 11/02 0700  In: 1608.3 [I.V.:1608.3]  Out: 450 [Urine:250]    Exam:    General: Elderly, well developed female, alert, NAD, OX3  Head/Neck: NCAT, supple, No masses, No lymphadenopathy  CVS:Regular rate and rhythm, no M/R/G, S1/S2 heard, no thrill  Lungs:Clear to auscultation bilaterally, no wheezes, rhonchi, or rales  Abdomen: Soft, Nontender, No distention, Normal Bowel sounds, No hepatomegaly  Extremities: No C/C/E, pulses palpable 2+  Skin:normal texture and turgor, no rashes, no lesions. Surgical incision clean and dry  Neuro:grossly normal , follows commands  Psych:appropriate                Labs: Results:       Chemistry Recent Labs     11/01/18  1720   GLU 92   *   K 3.3*   CL 99*   CO2 26   BUN 15   CREA 0.80   CA 7.9*   AGAP 10   BUCR 19   *   TP 5.7*   ALB 2.7*   GLOB 3.0   AGRAT 0.9      CBC w/Diff Recent Labs     11/01/18  1720 10/31/18  0215   WBC 14.1* 8.9   RBC 3.05* 3.01*   HGB 8.2* 8.2*   HCT 25.7* 25.3*    205   GRANS 79*  --    LYMPH 7*  --    EOS 1  --       Cardiac Enzymes Recent Labs     11/01/18  1720   *   CKND1 0.2      Coagulation No results for input(s): PTP, INR, APTT in the last 72 hours. No lab exists for component: INREXT    Lipid Panel No results found for: CHOL, CHOLPOCT, CHOLX, CHLST, CHOLV, 362491, HDL, LDL, LDLC, DLDLP, 489315, VLDLC, VLDL, TGLX, TRIGL, TRIGP, TGLPOCT, CHHD, CHHDX   BNP No results for input(s): BNPP in the last 72 hours.    Liver Enzymes Recent Labs     11/01/18  1720   TP 5.7*   ALB 2.7*   *   SGOT 42*      Thyroid Studies No results found for: T4, T3U, TSH, TSHEXT     Procedures/imaging: see electronic medical records for all procedures/Xrays and details which were not copied into this note but were reviewed prior to creation of [address]

## 2018-11-02 NOTE — ROUTINE PROCESS
Bedside and Verbal shift change report given to [facility] RJIA (oncoming nurse) by TORIBIO Bowman R.N. (offgoing nurse). Report included the following information SBAR, Kardex, Procedure Summary, Intake/Output, MAR, Accordion, Recent Results and Med Rec Status.

## 2018-11-02 NOTE — ED NOTES
Verbal shift change report given to Jade Corea (oncoming nurse) by Karen Lopes (offgoing nurse). Report included the following information SBAR, Kardex, ED Summary, Procedure Summary, MAR and Recent Results.

## 2018-11-02 NOTE — PROGRESS NOTES
Orders received. Chart reviewed. PT assessment not performed as pt is BRI for vascular. Will attempt PT assessment at another time once pt's schedule allows.

## 2018-11-02 NOTE — PROGRESS NOTES
Problem: Mobility Impaired (Adult and Pediatric)  Goal: *Acute Goals and Plan of Care (Insert Text)  Physical Therapy Goals   Initiated 11/2/2018 and to be accomplished within 5-7 day(s)  1. Patient will move from supine <> sit with S in prep for out of bed activity and change of position. 2.  Patient will perform sit<> stand with S with LRAD in prep for transfers/ambulation. 3.  Patient will transfer from bed <> chair with S with LRAD for time up in chair for completion of ADL activity. 4.  Patient will ambulate 150 feet with LRAD/S for improved functional mobility/safe discharge. 5.  Patient will ascend/descend 3-5 stairs with B/L handrails with minimal assistance/contact guard assist for home re-entry as needed. Outcome: Progressing Towards Goal  physical Therapy EVALUATION    Patient: Ashu Forrest (14 y.o. female)  Date: 11/2/2018  Primary Diagnosis: Fever in adult  Hypotension  S/P laminectomy with spinal fusion  SIRS (systemic inflammatory response syndrome) ([facility] [address]       Precautions:Back, Fall, Spinal    ASSESSMENT :  Based on the objective data described below, the patient presents with decrease independence w/ bed mobility, transfers, gait, and step negotiation. Pt seen in supine prior to session w/ telemonitor donned. Pt reported 1/10 pain in lower back and L LE which she reports sxs of burning pain from gluts to posterior thigh. Pt is familiar to this PT. Pt reports PTA that she has been able to perform mobility task w/ brace and RW. Pt able to sit at the EOB w/ no c/o lightheadedness or dizziness. Pt able to ambulate 300 ft w/ RW/GB/Brace but required a standing rest break as pt reported increase dizziness. Once the dizziness subsided pt continued to ambulate and was transferred back to room. Pt left in supine after session, call bell and tray in reach, nurse notified after session. Patient will benefit from skilled intervention to address the above impairments.   Patients rehabilitation potential is considered to be Good  Factors which may influence rehabilitation potential include:   []         None noted  []         Mental ability/status  []         Medical condition  []         Home/family situation and support systems  []         Safety awareness  []         Pain tolerance/management  []         Other:      PLAN :  Recommendations and Planned Interventions:  []           Bed Mobility Training             []    Neuromuscular Re-Education  []           Transfer Training                   []    Orthotic/Prosthetic Training  []           Gait Training                          []    Modalities  []           Therapeutic Exercises          []    Edema Management/Control  []           Therapeutic Activities            []    Patient and Family Training/Education  []           Other (comment):    Frequency/Duration: Patient will be followed by physical therapy once/twice daily to address goals. Discharge Recommendations: Home Health  Further Equipment Recommendations for Discharge: N/A     SUBJECTIVE:   Patient stated I feel pretty okay, just need to get some sleep.     OBJECTIVE DATA SUMMARY:     Past Medical History:   Diagnosis Date    Acid reflux     Arthritis     GERD (gastroesophageal reflux disease)     Hypertension 2004    Ill-defined condition     \"lichen plautus per paperwork    Lichen planus     PUD (peptic ulcer disease)     Unspecified adverse effect of anesthesia     itching     Past Surgical History:   Procedure Laterality Date    HX HEENT      sinus surgery    HX ORTHOPAEDIC  1964    right femur fracture pin    HX ORTHOPAEDIC  1965    bone graft right femur    HX ORTHOPAEDIC  1968    right femur pin removal    HX ORTHOPAEDIC  1990    maura bunnion feet surgery    HX ORTHOPAEDIC  1996    right hip replacement    HX ORTHOPAEDIC  2004    right knee replacement    HX ORTHOPAEDIC  2008    carpal tunnel left    HX ORTHOPAEDIC  2008    cervical surgery    HX ORTHOPAEDIC      shoulder replacement     Barriers to Learning/Limitations: yes;  physical  Compensate with: Verbal Cues and Tactile Cues  Prior Level of Function/Home Situation:   Home Situation  Home Environment: Private residence  # Steps to Enter: 5  Rails to Enter: Yes  Hand Rails : Bilateral  One/Two Story Residence: One story  Living Alone: Yes  Support Systems: Spouse/Significant Other/Partner  Patient Expects to be Discharged to[de-identified] Private residence  Current DME Used/Available at Home: Walker, rolling  Critical Behavior:  Neurologic State: Alert  Orientation Level: Oriented X4  Psychosocial  Purposeful Interaction: Yes  Pt Identified Daily Priority: Clinical issues (comment)  Car[employer] Process: Teaching/learning; Attend basic human needs  Caring Interventions: Therapeutic modalities  Therapeutic Modalities: Intentional therapeutic touch  Skin Condition/Temp: Warm;Dry  Skin Integrity: Incision (comment)(Right chest mid lower back)  Skin Integumentary  Skin Color: Appropriate for ethnicity  Skin Condition/Temp: Warm;Dry  Skin Integrity: Incision (comment)(Right chest mid lower back)  Turgor: Non-tenting  Hair Growth: Present  Varicosities: Absent  Strength:    Strength: Generally decreased, functional  Tone & Sensation:   Tone: Normal  Sensation: Intact  Range Of Motion:  AROM: Generally decreased, functional  Functional Mobility:  Bed Mobility:  Rolling: Supervision  Supine to Sit: Supervision  Sit to Supine: Supervision  Scooting: Supervision  Transfers:  Sit to Stand: Contact guard assistance  Stand to Sit: Contact guard assistance  Balance:   Sitting: Intact  Standing: Intact; With support  Ambulation/Gait Training:  Distance (ft): 300 Feet (ft)  Assistive Device: Brace/Splint;Gait belt;Walker, rolling  Ambulation - Level of Assistance: Supervision;Contact guard assistance  Gait Description (WDL): Exceptions to WDL  Gait Abnormalities: Decreased step clearance  Base of Support: Narrowed  Speed/Milena: Slow  Step Length: Left shortened;Right shortened  Swing Pattern: Left asymmetrical;Right asymmetrical    Pain:  Pain Scale 1: Numeric (0 - 10)  Pain Intensity 1: 1  Pain Location 1: Leg  Pain Orientation 1: Left  Pain Description 1: Aching;Burning  Pain Intervention(s) 1: Medication (see MAR)  Activity Tolerance:   Good  Please refer to the flowsheet for vital signs taken during this treatment. After treatment:   []         Patient left in no apparent distress sitting up in chair  [x]         Patient left in no apparent distress in bed  [x]         Call bell left within reach  [x]         Nursing notified  [x]         Caregiver present (spouse)  []         Bed alarm activated    COMMUNICATION/EDUCATION:   [x]         Fall prevention education was provided and the patient/caregiver indicated understanding. [x]         Patient/family have participated as able in goal setting and plan of care. [x]         Patient/family agree to work toward stated goals and plan of care. []         Patient understands intent and goals of therapy, but is neutral about his/her participation. []         Patient is unable to participate in goal setting and plan of care. Thank you for this referral.  Didi Jon, PT   Time Calculation: 16 mins   Mobility:  Current  CI= 1-19%   Goal  CI= 1-19%. The severity rating is based on the Other Based on functional assessment

## 2018-11-02 NOTE — ROUTINE PROCESS
Bedside and Verbal shift change report given to Keny Morales (oncoming nurse) by Ralf Collazo (offgoing nurse). Report included the following information SBAR, Kardex, MAR and Recent Results.  NSR BBB

## 2018-11-02 NOTE — PHYSICIAN ADVISORY
Letter of Admission Status Determination: Upgrade to Inpatient     Tilden Skiff was hospitalized as observation status on 11/1/2018. Her hospital stay has already crossed one medically necessary midnight for management of febrile illness and hypotension. On day 2, this high risk patient with persistent concern for infection continues to require hospital services based on current plans including ongoing IV antibiotics, further evaluation, and close monitoring. Since Ms. Tilden Skiff is expected to need at least two midnights of medically necessary hospital care, she will meet the benchmark for Inpatient Admission. Based on the documented clinical condition and care plan, we recommend upgrading patient's hospitalization status to INPATIENT. The final decision regarding the patient's hospitalization status depends on the attending physician's clinical judgment.        Bijan Ellis MD, CLAUDY, [address] [facility]  Physician Obey Vera.  [phone]

## 2018-11-02 NOTE — PROGRESS NOTES
Pharmacy Dosing Services: vancomycin    Consult for Vancomycin Dosing by Pharmacy by Dr. Demetri Quiñones provided for this 70y.o. year old female , for indication of empiric therapy. Day of Therapy 1    Ht Readings from Last 1 Encounters:   11/01/18 157.5 cm (62\")        Wt Readings from Last 1 Encounters:   11/01/18 58.1 kg (128 lb)        Other Current Antibiotics Zosyn 3.375gm IV q 6 hr   Significant Cultures Pending from micro- lab   Serum Creatinine Lab Results   Component Value Date/Time    Creatinine 0.80 11/01/2018 05:20 PM      Creatinine Clearance Estimated Creatinine Clearance: 47.3 mL/min (based on SCr of 0.8 mg/dL). BUN Lab Results   Component Value Date/Time    BUN 15 11/01/2018 05:20 PM      WBC Lab Results   Component Value Date/Time    WBC 14.1 (H) 11/01/2018 05:20 PM      H/H Lab Results   Component Value Date/Time    HGB 8.2 (L) 11/01/2018 05:20 PM      Platelets Lab Results   Component Value Date/Time    PLATELET 892 61/24/3605 05:20 PM      Temp 100.1 °F (37.8 °C)     Start Vancomycin therapy, with loading dose of 1500 (mg) at 0200/ 11/02/2018(time/date). Follow with maintenance dose of 1000(mg) at 0200/11/03/2018  (time/date), every 24 hours (frequency). Dose calculated to approximate a therapeutic trough of 15-20mcg/mL. Pharmacy to follow daily and will make changes to dose and/or frequency based on clinical status. Estimated Pharmacokinetic Parameters (based on population kinetics)  Vd: 41 L (0.7 L/kg)   Laith: 0.038 hr-1 (T1/2 = 18.2 hrs)     Dosing Recommendations   Vancomycin dose: 1000 mg IV Q24hrs (infused over 1 hr)   Estimated peak: 40.3 mcg/mL   Estimated trough: 16.8 mcg/mL   Estimated AUC:SIDDHARTHA: 647 mcg*hr/mL (assumed SIDDHARTHA 1 mcg/mL)     A/P:   1. Recommend vancomycin 1000 mg IV Q24hrs (17 mg/kg)   2. Consider a vancomycin trough level prior to the 4th dose. 3. Please monitor renal function (urine output, BUN/SCr).  Dose adjustments may be necessary with a significant change in renal function. 4. Vancomycin loading dose of 1500 mg to facilitate rapid attainment of target trough serum vancomycin levels.     Pharmacist Mary Newman, HUSSAIND

## 2018-11-02 NOTE — H&P
History & Physical    Patient: Julio C Chan MRN: [id number]  CSN: [id number]    YOB: 1940  Age: 70 y.o. Sex: female      DOA: 11/1/2018  Primary Care Provider:  Asher [facility] [place]      Assessment/Plan     Hospital Problems  Date Reviewed: 10/30/2018          Codes Class Noted POA    Constipation ICD-10-CM: K59.00  ICD-9-CM: 564.00  11/2/2018 Unknown        Anemia due to acute blood loss ICD-10-CM: D62  ICD-9-CM: 285.1  11/2/2018 Unknown        Positive D dimer ICD-10-CM: R79.89  ICD-9-CM: 790.92  11/2/2018 Unknown        Pain of left lower extremity ICD-10-CM: M79.605  ICD-9-CM: 729.5  11/2/2018 Unknown        Fever ICD-10-CM: R50.9  ICD-9-CM: 780.60  11/1/2018 Unknown        SIRS (systemic inflammatory response syndrome) ([facility] [address] ICD-10-CM: R65.10  ICD-9-CM: 995.90  11/1/2018 Unknown        GERD (gastroesophageal reflux disease) ICD-10-CM: K21.9  ICD-9-CM: 530.81  11/1/2018 Unknown        PUD (peptic ulcer disease) ICD-10-CM: K27.9  ICD-9-CM: 533.90  11/1/2018 Unknown        Hypokalemia ICD-10-CM: E87.6  ICD-9-CM: 276.8  11/1/2018 Unknown        Hypotension ICD-10-CM: I95.9  ICD-9-CM: 458.9  11/1/2018 Unknown        S/P laminectomy with spinal fusion ICD-10-CM: Z98.1  ICD-9-CM: V45.4  11/1/2018 Unknown        Fever in adult ICD-10-CM: R50.9  ICD-9-CM: 780.60  11/1/2018 Unknown                Admit to tele     Sirs   Not sure of source of infection  Will start empiric abx, vanc and zosyn  Bcx,wound cx,   Lactic acid wnl   Esr elevated, crp pending       Hypotension   Resolved per fluid, will hold home anti-htn medication     Anemia due to acute blood loss   Due to recent surgery   Will continue monitoring  Start iron      Constipation   Colace, miralax , amitiza.  Magnesium citrate     Hypokalemia   K replacement     gerd   ppi     S/p laminectomy with spinal fusion   Surgery done on oct 30   Will have ortho on board -Dr. Henok Rodriguez,  Pt/ot pain control   Will defer ortho for imagine-mri r/o infection       Leg pain and elevated d dimer   Will have pvl bilateral and cta r/o blood clots   No sob     Full code   DVT : scd-will call ortho tomorrow for heparin dvt prohy  ppi proph  CC: fever        HPI:     Naa Hall is a 70 y.o. female who hx of HTN, gerd, d/c from ortho service after laminectomy per Dr. Rosemarie Sapp came to ER due to fever/dizziness. She was d/c home yesterday with low BP and low T. She continues to take pain meds with anti-htn medication at home. She felt dizziness/fever/chills today and was found hypotension per PT. She had L383-662. BP 99/42. Ns bolus was given and bp improving. UA was clear, cxr results still pending. She reported pain in left leg worsen after the surgery and pain down to left calf. She denies any sob/chest pain. chronic coughs for months. Visit Vitals  /53 (BP 1 Location: Left arm, BP Patient Position: At rest)   Pulse 91   Temp 100.1 °F (37.8 °C)   Resp 16   Ht 5' 2\" (1.575 m)   Wt 58.1 kg (128 lb)   SpO2 99%   BMI 23.41 kg/m²      O2 Device: Room air      Past Medical History:   Diagnosis Date    Acid reflux     Arthritis     GERD (gastroesophageal reflux disease)     Hypertension 2004    Ill-defined condition     \"lichen plautus per paperwork    Lichen planus     PUD (peptic ulcer disease)     Unspecified adverse effect of anesthesia     itching       Past Surgical History:   Procedure Laterality Date    HX HEENT      sinus surgery    HX ORTHOPAEDIC  1964    right femur fracture pin    HX ORTHOPAEDIC  1965    bone graft right femur    HX ORTHOPAEDIC  1968    right femur pin removal    HX ORTHOPAEDIC  1990    maura bunnion feet surgery    HX ORTHOPAEDIC  1996    right hip replacement    HX ORTHOPAEDIC  2004    right knee replacement    HX ORTHOPAEDIC  2008    carpal tunnel left    HX ORTHOPAEDIC  2008    cervical surgery    HX ORTHOPAEDIC      shoulder replacement       History reviewed. No pertinent family history.     Social History Socioeconomic History    Marital status:      Spouse name: Not on file    Number of children: Not on file    Years of education: Not on file    Highest education level: Not on file   Social Needs    Financial resource strain: Not on file    Food insecurity - worry: Not on file    Food insecurity - inability: Not on file    Transportation needs - medical: Not on file   [employer] needs - non-medical: Not on file   Occupational History    Not on file   Tobacco Use    Smoking status: Never Smoker    Smokeless tobacco: Never Used   Substance and Sexual Activity    Alcohol use: No    Drug use: No    Sexual activity: Not on file   Other Topics Concern    Not on file   Social History Narrative    Not on file       Prior to Admission medications    Medication Sig Start Date End Date Taking? Authorizing Provider   diazePAM (VALIUM) 5 mg tablet Take 0.5 Tabs by mouth three (3) times daily as needed. Max Daily Amount: 7.5 mg. 10/31/18   JOSE Voss   HYDROcodone-acetaminophen (NORCO) 5-325 mg per tablet Take 1-1.5 Tabs by mouth every four (4) hours as needed. Max Daily Amount: 9 Tabs. 10/31/18   JOSE Voss   lubiPROStone (AMITIZA) 24 mcg capsule Take 1 Cap by mouth two (2) times daily (with meals) for 30 days. 10/31/18 11/30/18  JOSE Voss   naloxone [facility]) 4 mg/actuation nasal spray Use 1 spray intranasally, then discard. Repeat with new spray every 2 min as needed for opioid overdose symptoms, alternating nostrils. 10/31/18   JOSE Voss   metoprolol succinate (TOPROL-XL) 25 mg XL tablet Take 25 mg by mouth nightly. Provider, Shagufta   clobetasol (CLOBEX) 0.05 % lotion Apply  to affected area two (2) times a day. Other, MD Joy   dupilumab (DUPIXENT) 300 mg/2 mL syrg syringe 300 mg by SubCUTAneous route every fourteen (14) days. Vladislav, MD Joy   lisinopril (PRINIVIL, ZESTRIL) 5 mg tablet Take 2.5 mg by mouth daily.     Joy Loomis MD   simvastatin (ZOCOR) 20 mg tablet Take  by mouth nightly. Vladislav, MD Joy   zoledronic acid (RECLAST) 5 mg/100 mL pgbk 5 mg by IntraVENous route once. Joy Loomis MD   ondansetron (ZOFRAN ODT) 4 mg disintegrating tablet Take 1 Tab by mouth every eight (8) hours as needed for Nausea. 9/30/18   Bronwyn Cole MD   Hydrochlorothiazide 12.5 mg tablet Take 12.5 mg by mouth daily. Provider, Historical   omeprazole (PRILOSEC) 40 mg capsule Take 40 mg by mouth two (2) times a day. Provider, Historical   Magnesium Oxide 500 mg cap Take 400 mg by mouth daily. Provider, Historical   atorvastatin (LIPITOR) 20 mg tablet Take 20 mg by mouth daily. Provider, Historical   LORazepam (ATIVAN) 0.5 mg tablet Take 0.5 mg by mouth nightly as needed for Anxiety. Provider, Historical       Allergies   Allergen Reactions    Benadrilina [Diphenhydramine Hcl] Other (comments)     \"climbs the wall\"    Phenergan [Promethazine] Other (comments)     \"climbs the wall\"       Review of Systems  Gen: fever, chills, malaise, no weight loss/gain. Heent: No headache, rhinorrhea, epistaxis, ear pain, hearing loss, sinus pain, neck pain/stiffness, sore throat. Heart: No chest pain, palpitations, FITZPATRICK, pnd, or orthopnea. Resp: cough, no hemoptysis, wheezing and shortness of breath. GI: No nausea, vomiting, diarrhea, constipation, melena or hematochezia. : No urinary obstruction, dysuria or hematuria. Derm: No rash, new skin lesion or pruritis. Musc/skeletal: leg pain, back pain   Vasc: No edema, cyanosis or claudication. Endo: No heat/cold intolerance, no polyuria,polydipsia or polyphagia. Neuro: No unilateral weakness, numbness, tingling. No seizures. Heme: No easy bruising or bleeding.           Physical Exam:     Physical Exam:  Visit Vitals  /53 (BP 1 Location: Left arm, BP Patient Position: At rest)   Pulse 91   Temp 100.1 °F (37.8 °C)   Resp 16   Ht 5' 2\" (1.575 m)   Wt 58.1 kg (128 lb)   SpO2 99%   BMI 23.41 kg/m²      O2 Device: Room air    Temp (24hrs), Av.4 °F (38.6 °C), Min:100.1 °F (37.8 °C), Max:103 °F (39.4 °C)    No intake/output data recorded. 10/31 0701 - 1900  In: 1000 [I.V.:1000]  Out: -     General:  Awake, cooperative, no distress. Head:  Normocephalic, without obvious abnormality, atraumatic. Eyes:  Conjunctivae/corneas clear, sclera anicteric, PERRL, EOMs intact. Nose: Nares normal. No drainage or sinus tenderness. Throat: Lips, mucosa, and tongue normal. .   Neck: Supple, symmetrical, trachea midline, no adenopathy. Lungs:   Clear to auscultation bilaterally. Heart:  Regular rate and rhythm, S1, S2 normal, no murmur, click, rub or gallop. Abdomen: Soft, non-tender. Bowel sounds normal. No masses,  No organomegaly. Extremities: Extremities normal, atraumatic, no cyanosis or edema. Pulses: 2+ and symmetric all extremities. Skin: Skin color-pink, texture, turgor normal. No rashes or lesions. Capillary refill normal ,wound was clean ,no discharge and no sign of infection    Neurologic: CNII-XII intact. No focal motor or sensory deficit.        Labs Reviewed:    BMP:   Lab Results   Component Value Date/Time     (L) 2018 05:20 PM    K 3.3 (L) 2018 05:20 PM    CL 99 (L) 2018 05:20 PM    CO2 26 2018 05:20 PM    AGAP 10 2018 05:20 PM    GLU 92 2018 05:20 PM    BUN 15 2018 05:20 PM    CREA 0.80 2018 05:20 PM    GFRAA >60 2018 05:20 PM    GFRNA >60 2018 05:20 PM     CMP:   Lab Results   Component Value Date/Time     (L) 2018 05:20 PM    K 3.3 (L) 2018 05:20 PM    CL 99 (L) 2018 05:20 PM    CO2 26 2018 05:20 PM    AGAP 10 2018 05:20 PM    GLU 92 2018 05:20 PM    BUN 15 2018 05:20 PM    CREA 0.80 2018 05:20 PM    GFRAA >60 2018 05:20 PM    GFRNA >60 2018 05:20 PM    CA 7.9 (L) 2018 05:20 PM    MG 2.2 2018 05:20 PM    ALB 2.7 (L) 11/01/2018 05:20 PM    TP 5.7 (L) 11/01/2018 05:20 PM    GLOB 3.0 11/01/2018 05:20 PM    AGRAT 0.9 11/01/2018 05:20 PM    SGOT 42 (H) 11/01/2018 05:20 PM    ALT 30 11/01/2018 05:20 PM     CBC:   Lab Results   Component Value Date/Time    WBC 14.1 (H) 11/01/2018 05:20 PM    HGB 8.2 (L) 11/01/2018 05:20 PM    HCT 25.7 (L) 11/01/2018 05:20 PM     11/01/2018 05:20 PM     All Cardiac Markers in the last 24 hours:   Lab Results   Component Value Date/Time     (H) 11/01/2018 05:20 PM    CKMB 1.1 11/01/2018 05:20 PM    CKND1 0.2 11/01/2018 05:20 PM    TROIQ <0.02 11/01/2018 05:20 PM     Recent Glucose Results:   Lab Results   Component Value Date/Time    GLU 92 11/01/2018 05:20 PM     ABG: No results found for: PH, PHI, PCO2, PCO2I, PO2, PO2I, HCO3, HCO3I, FIO2, FIO2I  COAGS: No results found for: APTT, PTP, INR  Liver Panel:   Lab Results   Component Value Date/Time    ALB 2.7 (L) 11/01/2018 05:20 PM    TP 5.7 (L) 11/01/2018 05:20 PM    GLOB 3.0 11/01/2018 05:20 PM    AGRAT 0.9 11/01/2018 05:20 PM    SGOT 42 (H) 11/01/2018 05:20 PM    ALT 30 11/01/2018 05:20 PM     (H) 11/01/2018 05:20 PM     Pancreatic Markers: No results found for: AMYLPOCT, AML, LIPPOCT, LPSE    Procedures/imaging: see electronic medical records for all procedures/Xrays and details which were not copied into this note but were reviewed prior to creation of Hermila Das MD, Internal Medicine     CC: JOSE Bhatia

## 2018-11-02 NOTE — PROGRESS NOTES
Problem: Self Care Deficits Care Plan (Adult)  Goal: *Acute Goals and Plan of Care (Insert Text)  Occupational Therapy Goals  Initiated 11/2/2018 within 7 day(s). 1.  Patient will perform lower body dressing with supervision/set-up using AE. 2.  Patient will perform toilet transfers with supervision/set-up. 3.  Patient will perform all aspects of toileting with supervision/set-up. 4.  Patient will participate in upper extremity therapeutic exercise/activities with supervision/set-up for 5 minutes. 5.  Patient will complete standing for 5 minutes with supervision during ADL to increase activity tolerance for functional activity. Occupational Therapy EVALUATION    Patient: Dav Major (89 y.o. female)  Date: 11/2/2018  Primary Diagnosis: Fever in adult  Hypotension  S/P laminectomy with spinal fusion  SIRS (systemic inflammatory response syndrome) (HCC)       Precautions:  Back, Fall, Spinal    ASSESSMENT :  Based on the objective data described below, the patient presents with fever, weakness and hypotension. Pt completed UB ADL with supervision and LB ADL with max assist this session. Pt reported has AE at home for LB dressing. Pt CGA for transfers and functional mobility using RW. Pt CGA for toileting transfers and toileting. Pt reporting dizziness when up in bathroom. BP taken when pt back in bed and /41. Pt could benefit from OT to increase I with ADLs, transfers, mobility, activity tolerance and strength for functional activity. Patient will benefit from skilled intervention to address the above impairments.   Patients rehabilitation potential is considered to be Good  Factors which may influence rehabilitation potential include:   []             None noted  []             Mental ability/status  []             Medical condition  []             Home/family situation and support systems  []             Safety awareness  []             Pain tolerance/management  []             Other: PLAN :  Recommendations and Planned Interventions:  [x]               Self Care Training                  [x]        Therapeutic Activities  [x]               Functional Mobility Training    []        Cognitive Retraining  [x]               Therapeutic Exercises           [x]        Endurance Activities  [x]               Balance Training                   []        Neuromuscular Re-Education  []               Visual/Perceptual Training     [x]   Home Safety Training  [x]               Patient Education                 [x]        Family Training/Education  []               Other (comment):    Frequency/Duration: Patient will be followed by occupational therapy 1-2 times per day/4-7 days per week to address goals. Discharge Recommendations: Home Health  Further Equipment Recommendations for Discharge: N/A     SUBJECTIVE:   Patient stated I'm a little dizzy.     OBJECTIVE DATA SUMMARY:     Past Medical History:   Diagnosis Date    Acid reflux     Arthritis     GERD (gastroesophageal reflux disease)     Hypertension 2004    Ill-defined condition     \"lichen plautus per paperwork    Lichen planus     PUD (peptic ulcer disease)     Unspecified adverse effect of anesthesia     itching     Past Surgical History:   Procedure Laterality Date    HX HEENT      sinus surgery    HX ORTHOPAEDIC  1964    right femur fracture pin    HX ORTHOPAEDIC  1965    bone graft right femur    HX ORTHOPAEDIC  1968    right femur pin removal    HX ORTHOPAEDIC  1990    maura bunnion feet surgery    HX ORTHOPAEDIC  1996    right hip replacement    HX ORTHOPAEDIC  2004    right knee replacement    HX ORTHOPAEDIC  2008    carpal tunnel left    HX ORTHOPAEDIC  2008    cervical surgery    HX ORTHOPAEDIC      shoulder replacement     Barriers to Learning/Limitations: None  Compensate with: visual, verbal, tactile, kinesthetic cues/model  Prior Level of Function/Home Situation: I with ADLs prior to back surgery  Home Situation  Home Environment: Private residence  # Steps to Enter: 5  Rails to Enter: Yes  Hand Rails : Bilateral  One/Two Story Residence: One story  Living Alone: Yes  Support Systems: Spouse/Significant Other/Partner  Patient Expects to be Discharged to[de-identified] Private residence  Current DME Used/Available at Home: Theora Au, rolling  []  Right hand dominant   []  Left hand dominant  Cognitive/Behavioral Status:  Neurologic State: Alert  Orientation Level: Oriented X4     Safety/Judgement: Awareness of environment; Fall prevention  Skin: incision on lower back covered with dressing  Edema: none noted  Vision/Perceptual:  N/A  Coordination:  Fine Motor Skills-Upper: Left Intact; Right Intact    Gross Motor Skills-Upper: Left Intact; Right Intact  Balance:  Sitting: Intact  Standing: Intact; With support  Strength:  Strength: Generally decreased, functional  Tone & Sensation:  Tone: Normal  Sensation: Intact  Range of Motion:  AROM: Generally decreased, functional  Functional Mobility and Transfers for ADLs:  Bed Mobility:  Rolling: Supervision  Supine to Sit: Supervision  Sit to Supine: Supervision  Scooting: Supervision  Transfers:  Sit to Stand: Contact guard assistance   Toilet Transfer : Contact guard assistance   ADL Assessment:  Upper Body Dressing: Supervision  Lower Body Dressing: Maximum assistance  Toileting: Minimum assistance  ADL Intervention:  Cognitive Retraining  Safety/Judgement: Awareness of environment; Fall prevention    Pain:  Pain Scale 1: Numeric (0 - 10)  Pain Intensity 1: 1  Pain Location 1: Leg  Pain Orientation 1: Left  Pain Description 1: Aching;Burning  Pain Intervention(s) 1: Medication (see MAR)  Activity Tolerance:   fair  Please refer to the flowsheet for vital signs taken during this treatment.   After treatment:   [] Patient left in no apparent distress sitting up in chair  [x] Patient left in no apparent distress in bed  [x] Call bell left within reach  [] Nursing notified  [] Caregiver present  [] Bed alarm activated    COMMUNICATION/EDUCATION:   [x] Home safety education was provided and the patient/caregiver indicated understanding. [x] Patient/family have participated as able in goal setting and plan of care. [x] Patient/family agree to work toward stated goals and plan of care. [] Patient understands intent and goals of therapy, but is neutral about his/her participation. [] Patient is unable to participate in goal setting and plan of care. Thank you for this referral.  Domonique Hand, OTR/L  Time Calculation: 38 mins     Carry  Current  CJ= 20-39%    Goal  CI= 1-19%. The severity rating is based on the Level of Assistance required for Functional Mobility and ADLs.

## 2018-11-02 NOTE — PROGRESS NOTES
ER[facility] Vargas [facility] to send patient down for CTA of Chest--01:35

## 2018-11-02 NOTE — PROGRESS NOTES
7860: Received verbal bedside report from off going nurse Robert Skinner R.N.. Patient care received. Patient alert and oriented x4. Patient stable. Call light with in reach bed in lowest position. 9278: Pt received PRN Toradol for back pain. Reassessment in flow sheet. 1022: Received PRN Norco for left leg pain. Reassessment in flow sheet. Pt also received PRN Zofran. 1324: Pt received PRN Mylicon.

## 2018-11-02 NOTE — PROGRESS NOTES
Chart reviewed. Pt admitted in obs status for SIRS. Pt noted to be febrile. Provider, please advise if pt will require IV abx at discharge. If so, will need signed OPIC form/hard scripts. Met with pt at bedside. Pt lives w her . Pts  will drive her home at discharge. Pts home address confirmd per face sheet. Pt previously dc from hospital with [facility]. FOC offered for [facility] and pt would like to cont using [facility]. Referral will be placed with [facility] Lani for assistance. Pt has RW. No other dc concerns identified at this time. CM will cont to be available. 1500  Spoke again with patient. Pt would like to switch HH to [facility]. Alma Ventura made aware and will place referral.    Reason for ReAdmission:   Per H&P, pt is a 70 y.o. female who hx of HTN, gerd, d/c from ortho service after laminectomy per Dr. Pete Roanoke came to ER due to fever/dizziness. She was d/c home yesterday with low BP and low T. She continues to take pain meds with anti-htn medication at home. She felt dizziness/fever/chills today and was found hypotension per PT. She had I626-185. BP 99/42. Ns bolus was given and bp improving. UA was clear, cxr results still pending. She reported pain in left leg worsen after the surgery and pain down to left calf. RRAT Score:    14 MOD              Do you (patient/family) have any concerns for transition/discharge? None at this time              Plan for utilizing home health:     Yes, [facility]    Likelihood of readmission?   mod            Transition of Care Plan:    DC home with home health, MD follow up and family assistance. PLEASE NOTE--Encounter / Re-Admission Within 30 Days  This patient has had another encounter or admission within the last 30 days. Care Management Interventions  PCP Verified by CM: Yes  Mode of Transport at Discharge:  Other (see comment)(family)  Transition of Care Consult (CM Consult): Discharge Planning, [place] [address] [address]: No  Reason Outside [place]: Patient already serviced by other home care/hospice agency([facility])  Current Support Network: Lives with Spouse  Confirm Follow Up Transport: Family  Plan discussed with Pt/Family/Caregiver: Yes  Freedom of Choice Offered: Yes  Discharge Location  Discharge Placement: Home with home health

## 2018-11-02 NOTE — PROGRESS NOTES
0044-Verbal report received from Henry Hogan RN. Patient coming to room 337 for observation for post-op laminectomy fever. 0100-Patient received in Room 337 at this time. Placed on Tele box 7. Bathed and gown changed. Dressing down mid-back is c/d/i with mepilex in place. 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 6 visible stiches intact and are to be removed on 11/08/18 per patient. Replaced bandage. 0215-Take via bed to CT for CT of chest at this time without difficulty. 0250-Vancomycin given passed time due to being in CT.     0400-No changes from previous assessment. 0500-Large emesis at this time after drink Mag Citrate. Only smear BM noted. Zofran given for nausea. Patient reports burning/sharp 6-10 pain in lower back at incision site. No swelling or bleeding noted. Gave PRN Norco per orders for pain. 0600-Resting quietly on Room air and appears comfortable. 0700-Bedside and Verbal shift change report given to Gabby Diana (oncoming nurse) by Luis Enrique Ramirez RN (offgoing nurse). Report included the following information SBAR, Kardex, ED Summary, Intake/Output and MAR.

## 2018-11-02 NOTE — PROGRESS NOTES
[address] care of patient from Rick Roth [address]141. Florence Queen. 1812 Norco 5-325 mg po prn given for complaints of left leg pain. See doc-flow sheet for follow up.

## 2018-11-03 LAB
ANION GAP SERPL CALC-SCNC: 12 MMOL/L (ref 3–18)
BACTERIA SPEC CULT: NORMAL
BASOPHILS # BLD: 0 K/UL (ref 0–0.1)
BASOPHILS NFR BLD: 0 % (ref 0–2)
BUN SERPL-MCNC: 12 MG/DL (ref 7–18)
BUN/CREAT SERPL: 19
CALCIUM SERPL-MCNC: 7.2 MG/DL (ref 8.5–10.1)
CHLORIDE SERPL-SCNC: 105 MMOL/L (ref 100–108)
CO2 SERPL-SCNC: 23 MMOL/L (ref 21–32)
CREAT SERPL-MCNC: 0.64 MG/DL (ref 0.6–1.3)
DIFFERENTIAL METHOD BLD: ABNORMAL
EOSINOPHIL # BLD: 0.4 K/UL (ref 0–0.4)
EOSINOPHIL NFR BLD: 5 % (ref 0–5)
ERYTHROCYTE [DISTWIDTH] IN BLOOD BY AUTOMATED COUNT: 17.4 % (ref 11.6–14.5)
GLUCOSE SERPL-MCNC: 89 MG/DL (ref 74–99)
HCT VFR BLD AUTO: 22.6 % (ref 35–45)
HGB BLD-MCNC: 7.2 G/DL (ref 12–16)
LYMPHOCYTES # BLD: 1 K/UL (ref 0.9–3.6)
LYMPHOCYTES NFR BLD: 14 % (ref 21–52)
MAGNESIUM SERPL-MCNC: 2.7 MG/DL (ref 1.6–2.6)
MCH RBC QN AUTO: 27 PG (ref 24–34)
MCHC RBC AUTO-ENTMCNC: 31.9 G/DL (ref 31–37)
MCV RBC AUTO: 84.6 FL (ref 74–97)
MONOCYTES # BLD: 0.8 K/UL (ref 0.05–1.2)
MONOCYTES NFR BLD: 11 % (ref 3–10)
NEUTS SEG # BLD: 5.1 K/UL (ref 1.8–8)
NEUTS SEG NFR BLD: 70 % (ref 40–73)
PLATELET # BLD AUTO: 202 K/UL (ref 135–420)
PMV BLD AUTO: 9.8 FL (ref 9.2–11.8)
POTASSIUM SERPL-SCNC: 3.4 MMOL/L (ref 3.5–5.5)
RBC # BLD AUTO: 2.67 M/UL (ref 4.2–5.3)
SERVICE CMNT-IMP: NORMAL
SODIUM SERPL-SCNC: 140 MMOL/L (ref 136–145)
WBC # BLD AUTO: 7.4 K/UL (ref 4.6–13.2)

## 2018-11-03 PROCEDURE — 97530 THERAPEUTIC ACTIVITIES: CPT

## 2018-11-03 PROCEDURE — 74011000258 HC RX REV CODE- 258: Performed by: HOSPITALIST

## 2018-11-03 PROCEDURE — 74011250637 HC RX REV CODE- 250/637: Performed by: PHYSICIAN ASSISTANT

## 2018-11-03 PROCEDURE — P9016 RBC LEUKOCYTES REDUCED: HCPCS | Performed by: HOSPITALIST

## 2018-11-03 PROCEDURE — 86920 COMPATIBILITY TEST SPIN: CPT | Performed by: HOSPITALIST

## 2018-11-03 PROCEDURE — 74011000250 HC RX REV CODE- 250: Performed by: HOSPITALIST

## 2018-11-03 PROCEDURE — 30233N1 TRANSFUSION OF NONAUTOLOGOUS RED BLOOD CELLS INTO PERIPHERAL VEIN, PERCUTANEOUS APPROACH: ICD-10-PCS | Performed by: HOSPITALIST

## 2018-11-03 PROCEDURE — 86900 BLOOD TYPING SEROLOGIC ABO: CPT | Performed by: HOSPITALIST

## 2018-11-03 PROCEDURE — 97116 GAIT TRAINING THERAPY: CPT

## 2018-11-03 PROCEDURE — 65660000000 HC RM CCU STEPDOWN

## 2018-11-03 PROCEDURE — 93005 ELECTROCARDIOGRAM TRACING: CPT

## 2018-11-03 PROCEDURE — 36415 COLL VENOUS BLD VENIPUNCTURE: CPT | Performed by: HOSPITALIST

## 2018-11-03 PROCEDURE — 74011250636 HC RX REV CODE- 250/636: Performed by: HOSPITALIST

## 2018-11-03 PROCEDURE — 36430 TRANSFUSION BLD/BLD COMPNT: CPT

## 2018-11-03 PROCEDURE — 80048 BASIC METABOLIC PNL TOTAL CA: CPT | Performed by: HOSPITALIST

## 2018-11-03 PROCEDURE — 85025 COMPLETE CBC W/AUTO DIFF WBC: CPT | Performed by: HOSPITALIST

## 2018-11-03 PROCEDURE — 74011250637 HC RX REV CODE- 250/637: Performed by: HOSPITALIST

## 2018-11-03 PROCEDURE — 83735 ASSAY OF MAGNESIUM: CPT | Performed by: HOSPITALIST

## 2018-11-03 RX ORDER — DILTIAZEM HYDROCHLORIDE 180 MG/1
180 CAPSULE, COATED, EXTENDED RELEASE ORAL DAILY
Status: DISCONTINUED | OUTPATIENT
Start: 2018-11-04 | End: 2018-11-05 | Stop reason: HOSPADM

## 2018-11-03 RX ORDER — POTASSIUM CHLORIDE 20 MEQ/1
40 TABLET, EXTENDED RELEASE ORAL EVERY 4 HOURS
Status: COMPLETED | OUTPATIENT
Start: 2018-11-03 | End: 2018-11-03

## 2018-11-03 RX ORDER — SODIUM CHLORIDE 9 MG/ML
250 INJECTION, SOLUTION INTRAVENOUS AS NEEDED
Status: DISCONTINUED | OUTPATIENT
Start: 2018-11-03 | End: 2018-11-05 | Stop reason: HOSPADM

## 2018-11-03 RX ORDER — DIGOXIN 0.25 MG/ML
250 INJECTION INTRAMUSCULAR; INTRAVENOUS
Status: COMPLETED | OUTPATIENT
Start: 2018-11-03 | End: 2018-11-03

## 2018-11-03 RX ORDER — DILTIAZEM HYDROCHLORIDE 5 MG/ML
10 INJECTION INTRAVENOUS ONCE
Status: COMPLETED | OUTPATIENT
Start: 2018-11-03 | End: 2018-11-03

## 2018-11-03 RX ORDER — POTASSIUM CHLORIDE 20MEQ/15ML
40 LIQUID (ML) ORAL 2 TIMES DAILY WITH MEALS
Status: DISCONTINUED | OUTPATIENT
Start: 2018-11-03 | End: 2018-11-03

## 2018-11-03 RX ADMIN — PIPERACILLIN SODIUM,TAZOBACTAM SODIUM 3.38 G: 3; .375 INJECTION, POWDER, FOR SOLUTION INTRAVENOUS at 20:37

## 2018-11-03 RX ADMIN — PIPERACILLIN SODIUM,TAZOBACTAM SODIUM 3.38 G: 3; .375 INJECTION, POWDER, FOR SOLUTION INTRAVENOUS at 06:45

## 2018-11-03 RX ADMIN — PANTOPRAZOLE SODIUM 40 MG: 40 TABLET, DELAYED RELEASE ORAL at 10:09

## 2018-11-03 RX ADMIN — HYDROCODONE BITARTRATE AND ACETAMINOPHEN 1 TABLET: 5; 325 TABLET ORAL at 22:20

## 2018-11-03 RX ADMIN — PIPERACILLIN SODIUM,TAZOBACTAM SODIUM 3.38 G: 3; .375 INJECTION, POWDER, FOR SOLUTION INTRAVENOUS at 13:58

## 2018-11-03 RX ADMIN — HYDROCODONE BITARTRATE AND ACETAMINOPHEN 1 TABLET: 5; 325 TABLET ORAL at 17:51

## 2018-11-03 RX ADMIN — SIMETHICONE CHEW TAB 80 MG 80 MG: 80 TABLET ORAL at 17:51

## 2018-11-03 RX ADMIN — MULTIPLE VITAMINS W/ MINERALS TAB 1 TABLET: TAB at 10:08

## 2018-11-03 RX ADMIN — DIGOXIN 250 MCG: 0.25 INJECTION INTRAMUSCULAR; INTRAVENOUS at 03:11

## 2018-11-03 RX ADMIN — HYDROCODONE BITARTRATE AND ACETAMINOPHEN 1 TABLET: 5; 325 TABLET ORAL at 03:09

## 2018-11-03 RX ADMIN — ATORVASTATIN CALCIUM 20 MG: 20 TABLET, FILM COATED ORAL at 10:08

## 2018-11-03 RX ADMIN — VANCOMYCIN HYDROCHLORIDE 1000 MG: 10 INJECTION, POWDER, LYOPHILIZED, FOR SOLUTION INTRAVENOUS at 03:08

## 2018-11-03 RX ADMIN — DOCUSATE SODIUM 100 MG: 100 CAPSULE, LIQUID FILLED ORAL at 10:08

## 2018-11-03 RX ADMIN — PIPERACILLIN SODIUM,TAZOBACTAM SODIUM 3.38 G: 3; .375 INJECTION, POWDER, FOR SOLUTION INTRAVENOUS at 00:46

## 2018-11-03 RX ADMIN — HYDROCODONE BITARTRATE AND ACETAMINOPHEN 1 TABLET: 5; 325 TABLET ORAL at 07:04

## 2018-11-03 RX ADMIN — DILTIAZEM HYDROCHLORIDE 5 MG/HR: 5 INJECTION INTRAVENOUS at 03:37

## 2018-11-03 RX ADMIN — SODIUM CHLORIDE 500 ML: 900 INJECTION, SOLUTION INTRAVENOUS at 03:21

## 2018-11-03 RX ADMIN — DILTIAZEM HYDROCHLORIDE 10 MG: 5 INJECTION INTRAVENOUS at 02:31

## 2018-11-03 RX ADMIN — DOCUSATE SODIUM 100 MG: 100 CAPSULE, LIQUID FILLED ORAL at 22:20

## 2018-11-03 RX ADMIN — POTASSIUM CHLORIDE 40 MEQ: 20 TABLET, EXTENDED RELEASE ORAL at 10:09

## 2018-11-03 RX ADMIN — HYDROCODONE BITARTRATE AND ACETAMINOPHEN 1 TABLET: 5; 325 TABLET ORAL at 12:50

## 2018-11-03 RX ADMIN — POTASSIUM CHLORIDE 40 MEQ: 20 TABLET, EXTENDED RELEASE ORAL at 06:15

## 2018-11-03 RX ADMIN — SODIUM CHLORIDE 1000 MG: 900 INJECTION, SOLUTION INTRAVENOUS at 22:20

## 2018-11-03 NOTE — PROGRESS NOTES
Problem: Mobility Impaired (Adult and Pediatric)  Goal: *Acute Goals and Plan of Care (Insert Text)  Physical Therapy Goals   Initiated 11/2/2018 and to be accomplished within 5-7 day(s)  1. Patient will move from supine <> sit with S in prep for out of bed activity and change of position. 2.  Patient will perform sit<> stand with S with LRAD in prep for transfers/ambulation. 3.  Patient will transfer from bed <> chair with S with LRAD for time up in chair for completion of ADL activity. 4.  Patient will ambulate 150 feet with LRAD/S for improved functional mobility/safe discharge. 5.  Patient will ascend/descend 3-5 stairs with B/L handrails with minimal assistance/contact guard assist for home re-entry as needed. Outcome: Progressing Towards Goal  physical Therapy TREATMENT    Patient: Fatmata Cerrato (47 y.o. female)  Date: 11/3/2018  Diagnosis: Fever in adult  Hypotension  S/P laminectomy with spinal fusion  SIRS (systemic inflammatory response syndrome) (HCC) SIRS (systemic inflammatory response syndrome) (HCC)      Precautions: Back, Fall, Spinal   Chart, physical therapy assessment, plan of care and goals were reviewed. ASSESSMENT:  Pt present with C/o Right shoulder pain and has mild difficulty with transition to EOB. PT instructed to change sides in home setting. Pt's LSO/brace is poorly fit and is not able to be donned ing seated position. Pt is otherwise very mobile and mostly requiring motivation to increase output. Pt left sitting in chair, at bedside. Safe for xiong amb with NSG or family. Progression toward goals:  []      Improving appropriately and progressing toward goals  [x]      Improving slowly and progressing toward goals  []      Not making progress toward goals and plan of care will be adjusted     PLAN:  Patient continues to benefit from skilled intervention to address the above impairments. Continue treatment per established plan of care.   Discharge Recommendations: Home Health  Further Equipment Recommendations for Discharge:  brace/splint     SUBJECTIVE:   Patient stated I decided to take it easy today.     OBJECTIVE DATA SUMMARY:   Critical Behavior:  Neurologic State: Alert  Orientation Level: Oriented X4  Safety/Judgement: Awareness of environment, Fall prevention  Functional Mobility Training:  Bed Mobility:  Rolling: Supervision  Supine to Sit: Minimum assistance(Due to Right shoulder issue)  Scooting: Supervision  Transfers:  Sit to Stand: Contact guard assistance  Stand to Sit: Contact guard assistance  Balance:  Sitting: Intact  Standing: Intact; With support  Ambulation/Gait Training:  Distance (ft): 350 Feet (ft)  Assistive Device: Brace/Splint;Gait belt;Walker, rolling  Ambulation - Level of Assistance: Supervision;Contact guard assistance  Gait Abnormalities: Decreased step clearance  Base of Support: Narrowed  Speed/Milena: Slow  Step Length: Left shortened;Right shortened  Swing Pattern: Left asymmetrical;Right asymmetrical  Pain:  Pain Scale 1: Numeric (0 - 10)  Pain Intensity 1: 4  Pain Location 1: Shoulder  Pain Orientation 1: Right  Pain Description 1: Aching  Pain Intervention(s) 1: Medication (see MAR)  Activity Tolerance:   Good  Please refer to the flowsheet for vital signs taken during this treatment.   After treatment:   [] Patient left in no apparent distress sitting up in chair  [x] Patient left in no apparent distress in bed  [x] Call bell left within reach  [x] Nursing notified  [x] Caregiver present  [] Bed alarm activated      Bi Plascencia PTA   Time Calculation: 33 mins

## 2018-11-03 NOTE — ROUTINE PROCESS
Bedside and Verbal shift change report given to TORIBIO Albert RN (oncoming nurse) by MADELINE Mehta RN (offgoing nurse). Report included the following information SBAR, Kardex, Intake/Output, MAR and Recent Results.

## 2018-11-03 NOTE — PROGRESS NOTES
0206:  Notified by MT that patient's HR is irregular and sustaining in the 150-2 to 170's. Assessed patient, who is in no distress, but does state she feels her heart is \"fluttering\". Noted to have irregular pulse. Will notify MD.      5175:  Spoke with Dr. Kavitha Sim regarding patient's change in heart rhythm. She will place orders.

## 2018-11-03 NOTE — PROGRESS NOTES
Bedside and Verbal shift change report given to Jono Chau RN (oncoming nurse) by MADELINE Mehta RN (offgoing nurse). Report included the following information SBAR, Kardex, Intake/Output, MAR and Recent Results.

## 2018-11-03 NOTE — PROGRESS NOTES
Pharmacy Dosing Services: Vancomycin    Consult for Vancomycin Dosing by Pharmacy by Dr. Sauer Care provided for this 70y.o. year old female , for indication of SIRS, empiric therapy. Day of Therapy 2  Scr = 0.64   CrCl = 66 ml/min   WBC = 7.4     Due to improvement in Scr, dose adjusted to:  Vancomycin 1000 mg IV every 18 hours, with next dose scheduled for 11/3/18 at 21:00. Dose calculated to approximate a therapeutic trough of 15 - 20 mcg/mL. Ht Readings from Last 1 Encounters:   11/01/18 157.5 cm (62\")        Wt Readings from Last 1 Encounters:   11/02/18 64.5 kg (142 lb 3.2 oz)        Previous Regimen Vancomycin 1000 mg IV q24h   Other Current Antibiotics Zosyn 3.375 grams IV q6h   Significant Cultures pending   Serum Creatinine Lab Results   Component Value Date/Time    Creatinine 0.64 11/03/2018 02:15 AM      Creatinine Clearance Estimated Creatinine Clearance: 66 mL/min (based on SCr of 0.64 mg/dL). BUN Lab Results   Component Value Date/Time    BUN 12 11/03/2018 02:15 AM      WBC Lab Results   Component Value Date/Time    WBC 7.4 11/03/2018 02:15 AM      H/H Lab Results   Component Value Date/Time    HGB 7.2 (L) 11/03/2018 02:15 AM      Platelets Lab Results   Component Value Date/Time    PLATELET 908 41/34/9831 02:15 AM      Temp (P) 98.7 °F (37.1 °C)     Pharmacy to follow daily and will make changes to dose and/or frequency based on clinical status.   Pharmacist Liam Barahonazhanna

## 2018-11-03 NOTE — PROGRESS NOTES
Hospitalist Progress Note    Patient: Kye Vitale MRN: [id number]  CSN: [id number]    YOB: 1940  Age: 70 y.o. Sex: female    DOA: 11/1/2018 LOS:  LOS: 1 day          Chief Complaint: Afib with RVR          Assessment/Plan     Disposition :  Patient Active Problem List   Diagnosis Code    History of total hip arthroplasty Z96.649    DDD (degenerative disc disease), lumbar M51.36    Fever R50.9    SIRS (systemic inflammatory response syndrome) (HCC) R65.10    GERD (gastroesophageal reflux disease) K21.9    PUD (peptic ulcer disease) K27.9    Hypokalemia E87.6    Hypotension I95.9    S/P laminectomy with spinal fusion Z98.1    Fever in adult R50.9    Constipation K59.00    Anemia due to acute blood loss D62    Positive D dimer R79.89    Pain of left lower extremity M79.605     1. Continue empiric antibiotic coverage for now (Vanc). Seen by orthopedics. Lumbar incisions are clean. Continue to monitor. If patient develops fever, would obtain MRI lumbar spine to evaluate for abscess. 2. Acute blood loss anemia. Patient was receiving her first unit transfused on exam.  Monitor closely. 3. Afib with RVR; resolved. Rate is well controlled. Start Cardizem and discontinue gtt one hour after. 4. Continue bowel regimen. 5. K replacement. Monitor with daily labs. 6. PPI. 7. Seen by orthopedics. Stable per ortho. As above, if continued fever would get MRI lumbar spine to eval for abscess. Continue PT. 8. LE duplex done, no DVT.      DVT Prophylaxis - SCDs  Disposition - TBD      Subjective:    No events over night. Patient doing well this AM.  No new complaints.     Review of systems:    Constitutional: denies fevers, chills, myalgias  Respiratory: denies SOB, cough  Cardiovascular: denies chest pain, palpitations  Gastrointestinal: denies nausea, vomiting, diarrhea      Vital signs/Intake and Output:  Visit Vitals  /45 (BP 1 Location: Left arm, BP Patient Position: At rest)   Pulse 86   Temp 98.5 °F (36.9 °C)   Resp 18   Ht 5' 2\" (1.575 m)   Wt 64.5 kg (142 lb 3.2 oz)   SpO2 99%   BMI 26.01 kg/m²     Current Shift:  No intake/output data recorded. Last three shifts:  11/01 1901 - 11/03 0700  In: 1976 [P.O.:240; I.V.:1736]  Out: 450 [Urine:250]    Exam:    General: Well developed, alert, NAD, OX3  Head/Neck: NCAT, supple, No masses, No lymphadenopathy  CVS: Irregularly irregular rate, no M/R/G, S1/S2 heard, no thrill  Lungs:Clear to auscultation bilaterally, no wheezes, rhonchi, or rales  Abdomen: Soft, Nontender, No distention, Normal Bowel sounds, No hepatomegaly  Extremities: No C/C/E, pulses palpable 2+  Skin:normal texture and turgor, no rashes, no lesions  Neuro:grossly normal , follows commands  Psych:appropriate                Labs: Results:       Chemistry Recent Labs     11/03/18  0215 11/01/18  1720   GLU 89 92    135*   K 3.4* 3.3*    99*   CO2 23 26   BUN 12 15   CREA 0.64 0.80   CA 7.2* 7.9*   AGAP 12 10   BUCR 19 19   AP  --  143*   TP  --  5.7*   ALB  --  2.7*   GLOB  --  3.0   AGRAT  --  0.9      CBC w/Diff Recent Labs     11/03/18  0215 11/01/18  1720   WBC 7.4 14.1*   RBC 2.67* 3.05*   HGB 7.2* 8.2*   HCT 22.6* 25.7*    211   GRANS 70 79*   LYMPH 14* 7*   EOS 5 1      Cardiac Enzymes Recent Labs     11/01/18  1720   *   CKND1 0.2      Coagulation No results for input(s): PTP, INR, APTT in the last 72 hours. No lab exists for component: INREXT    Lipid Panel No results found for: CHOL, CHOLPOCT, CHOLX, CHLST, CHOLV, 693672, HDL, LDL, LDLC, DLDLP, 900515, VLDLC, VLDL, TGLX, TRIGL, TRIGP, TGLPOCT, CHHD, CHHDX   BNP No results for input(s): BNPP in the last 72 hours.    Liver Enzymes Recent Labs     11/01/18  1720   TP 5.7*   ALB 2.7*   *   SGOT 42*      Thyroid Studies No results found for: T4, T3U, TSH, TSHEXT     Procedures/imaging: see electronic medical records for all procedures/Xrays and details which were not copied into this note but were reviewed prior to creation of Blane Melendez MD

## 2018-11-03 NOTE — PROGRESS NOTES
0745: Received verbal bedside report from off going nurse MADELINE Michael 310, R.N. Patient care received. Patient alert and oriented x 4. Patient resting in bed denies pain. Patient stable. Call light with in reach bed in lowest position. 1250: Pt received PRN Norco for pain. Follow up assessment in flow sheet. 1751:Pt received PRN Norco for pain. Follow up assessment in flow sheet. 1921: Informed night nurse that patient should discontinue cardizem drip an hour after she receives the oral dose of cardizem. Shift uneventful.

## 2018-11-03 NOTE — PROGRESS NOTES
0300:  Reassumed care of patient from [address],  due to Afib with RVR and requiring Cardizem gtt. 0550:  Patient's HR now sustaining 115's to 120's. Called to Dr. Steve Sandoval  And orders received to increase Cardizem gtt rate to 10 mg/hr. 0942:  Previously received orders from Dr. Steve Sandoval to increase Cardizem drip rate to 10 mg/hr, but patient converted to NSR with a rate of mid-70's. Dr. Steve Sandoval on unit and verbal order received to keep drip at 5mg/hr for now. No increase was ever made to drip rate.

## 2018-11-03 NOTE — PROGRESS NOTES
Patient received Norco 1 tablet po for pain to right shoulder rating at 5/10, which provided relief. As per MADELINE Davidson RN patient experiencing Afib/RVR with heart rate 150s-170s. Dr. Park Damon notified by HANSA Espinal assisting in patient care with orders obtained. EROS Cardoso, Charge Nurse resumed care of patient to start on Cardizem gtt. Bedside report given to MADELINE Davidson RN including SBAR, MAR, Kardex, and I/O.

## 2018-11-03 NOTE — PROGRESS NOTES
0215 EKG obtained as per protocol. Pt denies chest pain. 8299 Dr Jean-Paul Mckinley paged to update on pt status. 0139 Dr Jean-Paul Mckinley made aware of EKG reading. 0231 Cardizem 10 mg IV push administered as ordered.     5493 Dr. Jean-Paul Mckinley made aware that pt HR remaining in the 150's    0311 Administered IV push digoxin as ordered

## 2018-11-03 NOTE — PROGRESS NOTES
Problem: Falls - Risk of  Goal: *Absence of Falls  Document Zack Fall Risk and appropriate interventions in the flowsheet.   Outcome: Progressing Towards Goal  Fall Risk Interventions:  Mobility Interventions: Assess mobility with egress test         Medication Interventions: Patient to call before getting OOB    Elimination Interventions: Call light in reach, Patient to call for help with toileting needs

## 2018-11-04 LAB
ABO + RH BLD: NORMAL
ANION GAP SERPL CALC-SCNC: 9 MMOL/L (ref 3–18)
ATRIAL RATE: 89 BPM
BASOPHILS # BLD: 0.1 K/UL (ref 0–0.1)
BASOPHILS NFR BLD: 1 % (ref 0–2)
BLD PROD TYP BPU: NORMAL
BLD PROD TYP BPU: NORMAL
BLOOD GROUP ANTIBODIES SERPL: NORMAL
BPU ID: NORMAL
BPU ID: NORMAL
BUN SERPL-MCNC: 6 MG/DL (ref 7–18)
BUN/CREAT SERPL: 12
CALCIUM SERPL-MCNC: 7.3 MG/DL (ref 8.5–10.1)
CALCULATED P AXIS, ECG09: 40 DEGREES
CALCULATED R AXIS, ECG10: 8 DEGREES
CALCULATED T AXIS, ECG11: 117 DEGREES
CALLED TO:,BCALL1: NORMAL
CHLORIDE SERPL-SCNC: 109 MMOL/L (ref 100–108)
CO2 SERPL-SCNC: 20 MMOL/L (ref 21–32)
CREAT SERPL-MCNC: 0.49 MG/DL (ref 0.6–1.3)
CROSSMATCH RESULT,%XM: NORMAL
CROSSMATCH RESULT,%XM: NORMAL
DATE LAST DOSE: ABNORMAL
DIAGNOSIS, 93000: NORMAL
DIFFERENTIAL METHOD BLD: ABNORMAL
EOSINOPHIL # BLD: 0.7 K/UL (ref 0–0.4)
EOSINOPHIL NFR BLD: 9 % (ref 0–5)
ERYTHROCYTE [DISTWIDTH] IN BLOOD BY AUTOMATED COUNT: 16.8 % (ref 11.6–14.5)
GLUCOSE SERPL-MCNC: 86 MG/DL (ref 74–99)
HCT VFR BLD AUTO: 29 % (ref 35–45)
HGB BLD-MCNC: 9.4 G/DL (ref 12–16)
LYMPHOCYTES # BLD: 0.8 K/UL (ref 0.9–3.6)
LYMPHOCYTES NFR BLD: 10 % (ref 21–52)
MAGNESIUM SERPL-MCNC: 2.5 MG/DL (ref 1.6–2.6)
MCH RBC QN AUTO: 27.5 PG (ref 24–34)
MCHC RBC AUTO-ENTMCNC: 32.4 G/DL (ref 31–37)
MCV RBC AUTO: 84.8 FL (ref 74–97)
MONOCYTES # BLD: 0.8 K/UL (ref 0.05–1.2)
MONOCYTES NFR BLD: 11 % (ref 3–10)
NEUTS SEG # BLD: 4.9 K/UL (ref 1.8–8)
NEUTS SEG NFR BLD: 69 % (ref 40–73)
P-R INTERVAL, ECG05: 162 MS
PLATELET # BLD AUTO: 224 K/UL (ref 135–420)
PMV BLD AUTO: 9.9 FL (ref 9.2–11.8)
POTASSIUM SERPL-SCNC: 3.9 MMOL/L (ref 3.5–5.5)
Q-T INTERVAL, ECG07: 424 MS
QRS DURATION, ECG06: 142 MS
QTC CALCULATION (BEZET), ECG08: 515 MS
RBC # BLD AUTO: 3.42 M/UL (ref 4.2–5.3)
REPORTED DOSE,DOSE: ABNORMAL UNITS
REPORTED DOSE/TIME,TMG: 2200
SODIUM SERPL-SCNC: 138 MMOL/L (ref 136–145)
SPECIMEN EXP DATE BLD: NORMAL
STATUS OF UNIT,%ST: NORMAL
STATUS OF UNIT,%ST: NORMAL
UNIT DIVISION, %UDIV: 0
UNIT DIVISION, %UDIV: 0
VANCOMYCIN TROUGH SERPL-MCNC: 6.5 UG/ML (ref 10–20)
VENTRICULAR RATE, ECG03: 89 BPM
WBC # BLD AUTO: 7.2 K/UL (ref 4.6–13.2)

## 2018-11-04 PROCEDURE — 74011250637 HC RX REV CODE- 250/637: Performed by: HOSPITALIST

## 2018-11-04 PROCEDURE — 74011250637 HC RX REV CODE- 250/637: Performed by: PHYSICIAN ASSISTANT

## 2018-11-04 PROCEDURE — 83735 ASSAY OF MAGNESIUM: CPT | Performed by: HOSPITALIST

## 2018-11-04 PROCEDURE — 74011000250 HC RX REV CODE- 250: Performed by: HOSPITALIST

## 2018-11-04 PROCEDURE — 80202 ASSAY OF VANCOMYCIN: CPT | Performed by: HOSPITALIST

## 2018-11-04 PROCEDURE — 85025 COMPLETE CBC W/AUTO DIFF WBC: CPT | Performed by: HOSPITALIST

## 2018-11-04 PROCEDURE — 74011250637 HC RX REV CODE- 250/637: Performed by: INTERNAL MEDICINE

## 2018-11-04 PROCEDURE — 74011250636 HC RX REV CODE- 250/636: Performed by: HOSPITALIST

## 2018-11-04 PROCEDURE — 36415 COLL VENOUS BLD VENIPUNCTURE: CPT | Performed by: HOSPITALIST

## 2018-11-04 PROCEDURE — 80048 BASIC METABOLIC PNL TOTAL CA: CPT | Performed by: HOSPITALIST

## 2018-11-04 PROCEDURE — 74011000258 HC RX REV CODE- 258: Performed by: HOSPITALIST

## 2018-11-04 PROCEDURE — 65660000000 HC RM CCU STEPDOWN

## 2018-11-04 RX ADMIN — HYDROCODONE BITARTRATE AND ACETAMINOPHEN 1 TABLET: 5; 325 TABLET ORAL at 04:11

## 2018-11-04 RX ADMIN — MULTIPLE VITAMINS W/ MINERALS TAB 1 TABLET: TAB at 10:15

## 2018-11-04 RX ADMIN — PIPERACILLIN SODIUM,TAZOBACTAM SODIUM 3.38 G: 3; .375 INJECTION, POWDER, FOR SOLUTION INTRAVENOUS at 20:51

## 2018-11-04 RX ADMIN — DILTIAZEM HYDROCHLORIDE 5 MG/HR: 5 INJECTION INTRAVENOUS at 04:21

## 2018-11-04 RX ADMIN — PIPERACILLIN SODIUM,TAZOBACTAM SODIUM 3.38 G: 3; .375 INJECTION, POWDER, FOR SOLUTION INTRAVENOUS at 15:35

## 2018-11-04 RX ADMIN — SIMETHICONE CHEW TAB 80 MG 80 MG: 80 TABLET ORAL at 21:07

## 2018-11-04 RX ADMIN — SODIUM CHLORIDE 1000 MG: 900 INJECTION, SOLUTION INTRAVENOUS at 17:11

## 2018-11-04 RX ADMIN — HYDROCODONE BITARTRATE AND ACETAMINOPHEN 1 TABLET: 5; 325 TABLET ORAL at 12:54

## 2018-11-04 RX ADMIN — PIPERACILLIN SODIUM,TAZOBACTAM SODIUM 3.38 G: 3; .375 INJECTION, POWDER, FOR SOLUTION INTRAVENOUS at 04:11

## 2018-11-04 RX ADMIN — PIPERACILLIN SODIUM,TAZOBACTAM SODIUM 3.38 G: 3; .375 INJECTION, POWDER, FOR SOLUTION INTRAVENOUS at 10:16

## 2018-11-04 RX ADMIN — PANTOPRAZOLE SODIUM 40 MG: 40 TABLET, DELAYED RELEASE ORAL at 10:14

## 2018-11-04 RX ADMIN — DILTIAZEM HYDROCHLORIDE 180 MG: 180 CAPSULE, COATED, EXTENDED RELEASE ORAL at 10:15

## 2018-11-04 RX ADMIN — DOCUSATE SODIUM 100 MG: 100 CAPSULE, LIQUID FILLED ORAL at 20:52

## 2018-11-04 RX ADMIN — LUBIPROSTONE 24 MCG: 24 CAPSULE, GELATIN COATED ORAL at 10:14

## 2018-11-04 RX ADMIN — ATORVASTATIN CALCIUM 20 MG: 20 TABLET, FILM COATED ORAL at 10:14

## 2018-11-04 RX ADMIN — SIMETHICONE CHEW TAB 80 MG 80 MG: 80 TABLET ORAL at 10:25

## 2018-11-04 RX ADMIN — LUBIPROSTONE 24 MCG: 24 CAPSULE, GELATIN COATED ORAL at 17:03

## 2018-11-04 NOTE — ROUTINE PROCESS
Bedside and Verbal shift change report given to MADELINE Mehta R.N. (oncoming nurse) by TORIBIO Best R.N. (offgoing nurse). Report included the following information SBAR, Kardex, Intake/Output, MAR, Accordion, Recent Results and Med Rec Status.

## 2018-11-04 NOTE — PROGRESS NOTES
Hospitalist Progress Note    Patient: Do Worthy MRN: [id number]  CSN: [id number]    YOB: 1940  Age: 70 y.o. Sex: female    DOA: 11/1/2018 LOS:  LOS: 2 days          Chief Complaint: Afib with RVR          Assessment/Plan     Disposition :  Patient Active Problem List   Diagnosis Code    History of total hip arthroplasty Z96.649    DDD (degenerative disc disease), lumbar M51.36    Fever R50.9    SIRS (systemic inflammatory response syndrome) (HCC) R65.10    GERD (gastroesophageal reflux disease) K21.9    PUD (peptic ulcer disease) K27.9    Hypokalemia E87.6    Hypotension I95.9    S/P laminectomy with spinal fusion Z98.1    Fever in adult R50.9    Constipation K59.00    Anemia due to acute blood loss D62    Positive D dimer R79.89    Pain of left lower extremity M79.605     1. Continue empiric antibiotic coverage for now (Vanc). Seen by orthopedics. Lumbar incisions are clean. Continue to monitor. Afebrile so far. If patient develops fever, would obtain MRI lumbar spine to evaluate for abscess. 2. Acute blood loss anemia; improved s/p transfusion with appropriate response Hemoglobin 7.2==>9.4. Monitor closely. 3. Afib with RVR; resolved. Rate is well controlled. Started Cardizem 180 po dailyand discontinued gtt one hour after. 4. Continue bowel regimen. 5. K replacement. Monitor with daily labs. 6. PPI. 7. Seen by orthopedics. Stable per ortho. As above, if continued fever would get MRI lumbar spine to eval for abscess. Continue PT. 8. LE duplex done, no DVT.      DVT Prophylaxis - SCDs  Disposition - If stable heart rate over night patient may be discharged to home      Subjective:    No events over night. Patient doing well this AM.  No new complaints.     Review of systems:    Constitutional: denies fevers, chills, myalgias  Respiratory: denies SOB, cough  Cardiovascular: denies chest pain, palpitations  Gastrointestinal: denies nausea, vomiting, diarrhea      Vital signs/Intake and Output:  Visit Vitals  /54 (BP 1 Location: Left arm, BP Patient Position: At rest)   Pulse 83   Temp 98.2 °F (36.8 °C)   Resp 18   Ht 5' 2\" (1.575 m)   Wt 64.4 kg (142 lb)   SpO2 100%   Breastfeeding? No   BMI 25.97 kg/m²     Current Shift:  No intake/output data recorded. Last three shifts:  11/02 1901 - 11/04 0700  In: 4332.4 [P.O.:1380; I.V.:2352.4]  Out: 1450 [Urine:1450]    Exam:    General: Well developed, alert, NAD, OX3  Head/Neck: NCAT, supple, No masses, No lymphadenopathy  CVS: Irregularly irregular rate, no M/R/G, S1/S2 heard, no thrill  Lungs:Clear to auscultation bilaterally, no wheezes, rhonchi, or rales  Abdomen: Soft, Nontender, No distention, Normal Bowel sounds, No hepatomegaly  Extremities: No C/C/E, pulses palpable 2+  Skin:normal texture and turgor, no rashes, no lesions  Neuro:grossly normal , follows commands  Psych:appropriate                Labs: Results:       Chemistry Recent Labs     11/04/18  0540 11/03/18 0215 11/01/18  1720   GLU 86 89 92    140 135*   K 3.9 3.4* 3.3*   * 105 99*   CO2 20* 23 26   BUN 6* 12 15   CREA 0.49* 0.64 0.80   CA 7.3* 7.2* 7.9*   AGAP 9 12 10   BUCR 12 19 19   AP  --   --  143*   TP  --   --  5.7*   ALB  --   --  2.7*   GLOB  --   --  3.0   AGRAT  --   --  0.9      CBC w/Diff Recent Labs     11/04/18  0540 11/03/18 0215 11/01/18  1720   WBC 7.2 7.4 14.1*   RBC 3.42* 2.67* 3.05*   HGB 9.4* 7.2* 8.2*   HCT 29.0* 22.6* 25.7*    202 211   GRANS 69 70 79*   LYMPH 10* 14* 7*   EOS 9* 5 1      Cardiac Enzymes Recent Labs     11/01/18  1720   *   CKND1 0.2      Coagulation No results for input(s): PTP, INR, APTT in the last 72 hours.     No lab exists for component: INREXT, INREXT    Lipid Panel No results found for: CHOL, CHOLPOCT, CHOLX, CHLST, CHOLV, 076464, HDL, LDL, LDLC, DLDLP, 567986, VLDLC, VLDL, TGLX, TRIGL, TRIGP, TGLPOCT, CHHD, CHHDX   BNP No results for input(s): BNPP in the last 72 hours.   Liver Enzymes Recent Labs     11/01/18  1720   TP 5.7*   ALB 2.7*   *   SGOT 42*      Thyroid Studies No results found for: T4, T3U, TSH, TSHEXT, TSHEXT     Procedures/imaging: see electronic medical records for all procedures/Xrays and details which were not copied into this note but were reviewed prior to creation of Tom [place] MD

## 2018-11-04 NOTE — PROGRESS NOTES
800 Assumed care of patient from [facility], [address] AM assessment completed, patient is alert and oriented x's 4, no c/o pain. NSR on the moonitor with a HR in the 80's. Lung fields are clear throughout on RA, 02 sat sustained at 100% with no cough. Patient is tolerating a regular diet without any N/V or gastric distention. Lumbar mepilex remain C/D/I without any breakthrough drainage. Cardizem gtt remain at 5mg, but will discontinue 2 hours after administration of PO cardizem. Scheduled medications administered as ordered without any complications. Patient is currently resting quietly in bed, no s/s of any distress, VSS, call bell and personal belongings within reach, will continue to monitor    1255 PRN pain medication administered per patient 's request. Patient experienced rectal bleed of bright red blood after BM. Patient stated that was not a new finding, occasionally occur, will follow up with MD. Patient is now back in bed, no s/s of any distress, will continue to monitor    1539 Scheduled IV antx administered as ordered without any complications. Patient is now OOB to the chair after ambulation from the BR, no s/s of any pain or distress, will continue to monitor    1732 Scheduled IV vanco administered after receiving trough level. Patient continue to sit up in chair with visitor at side, no s/s of any pain or distress, will continue to monitor    1828 patient is back in bed resting, no s/s of any pain or distress, will continue to monitor. 1939 Bedside and Verbal shift change report given to Iqra Yan RN (oncoming nurse) by Cherie Keane RN (offgoing nurse). Report included the following information SBAR, Accordion and Cardiac Rhythm nsr.

## 2018-11-04 NOTE — ROUTINE PROCESS
Bedside and Verbal shift change report given to ALYSSA Parker RN (oncoming nurse) by MADELINE Mehta RN (offgoing nurse). Report included the following information SBAR, Kardex, Intake/Output, MAR and Recent Results.

## 2018-11-05 VITALS
OXYGEN SATURATION: 97 % | TEMPERATURE: 98.2 F | BODY MASS INDEX: 26.13 KG/M2 | RESPIRATION RATE: 16 BRPM | SYSTOLIC BLOOD PRESSURE: 143 MMHG | DIASTOLIC BLOOD PRESSURE: 65 MMHG | WEIGHT: 141.98 LBS | HEIGHT: 62 IN | HEART RATE: 79 BPM

## 2018-11-05 LAB
ANION GAP SERPL CALC-SCNC: 11 MMOL/L (ref 3–18)
BASOPHILS # BLD: 0.1 K/UL (ref 0–0.1)
BASOPHILS NFR BLD: 1 % (ref 0–2)
BUN SERPL-MCNC: 5 MG/DL (ref 7–18)
BUN/CREAT SERPL: 10
CALCIUM SERPL-MCNC: 7.6 MG/DL (ref 8.5–10.1)
CHLORIDE SERPL-SCNC: 109 MMOL/L (ref 100–108)
CO2 SERPL-SCNC: 22 MMOL/L (ref 21–32)
CREAT SERPL-MCNC: 0.52 MG/DL (ref 0.6–1.3)
DIFFERENTIAL METHOD BLD: ABNORMAL
EOSINOPHIL # BLD: 0.3 K/UL (ref 0–0.4)
EOSINOPHIL NFR BLD: 5 % (ref 0–5)
ERYTHROCYTE [DISTWIDTH] IN BLOOD BY AUTOMATED COUNT: 16.7 % (ref 11.6–14.5)
GLUCOSE SERPL-MCNC: 85 MG/DL (ref 74–99)
HCT VFR BLD AUTO: 31.8 % (ref 35–45)
HGB BLD-MCNC: 10.3 G/DL (ref 12–16)
LYMPHOCYTES # BLD: 1 K/UL (ref 0.9–3.6)
LYMPHOCYTES NFR BLD: 14 % (ref 21–52)
MAGNESIUM SERPL-MCNC: 2.2 MG/DL (ref 1.6–2.6)
MCH RBC QN AUTO: 27.4 PG (ref 24–34)
MCHC RBC AUTO-ENTMCNC: 32.4 G/DL (ref 31–37)
MCV RBC AUTO: 84.6 FL (ref 74–97)
MONOCYTES # BLD: 0.9 K/UL (ref 0.05–1.2)
MONOCYTES NFR BLD: 13 % (ref 3–10)
NEUTS SEG # BLD: 4.6 K/UL (ref 1.8–8)
NEUTS SEG NFR BLD: 67 % (ref 40–73)
PLATELET # BLD AUTO: 264 K/UL (ref 135–420)
PMV BLD AUTO: 9.9 FL (ref 9.2–11.8)
POTASSIUM SERPL-SCNC: 4.1 MMOL/L (ref 3.5–5.5)
RBC # BLD AUTO: 3.76 M/UL (ref 4.2–5.3)
SODIUM SERPL-SCNC: 142 MMOL/L (ref 136–145)
WBC # BLD AUTO: 6.8 K/UL (ref 4.6–13.2)

## 2018-11-05 PROCEDURE — 74011250637 HC RX REV CODE- 250/637: Performed by: HOSPITALIST

## 2018-11-05 PROCEDURE — 83735 ASSAY OF MAGNESIUM: CPT | Performed by: HOSPITALIST

## 2018-11-05 PROCEDURE — 97116 GAIT TRAINING THERAPY: CPT

## 2018-11-05 PROCEDURE — 80048 BASIC METABOLIC PNL TOTAL CA: CPT | Performed by: HOSPITALIST

## 2018-11-05 PROCEDURE — 74011250637 HC RX REV CODE- 250/637: Performed by: INTERNAL MEDICINE

## 2018-11-05 PROCEDURE — 36415 COLL VENOUS BLD VENIPUNCTURE: CPT | Performed by: HOSPITALIST

## 2018-11-05 PROCEDURE — 85025 COMPLETE CBC W/AUTO DIFF WBC: CPT | Performed by: HOSPITALIST

## 2018-11-05 PROCEDURE — 74011250636 HC RX REV CODE- 250/636: Performed by: HOSPITALIST

## 2018-11-05 PROCEDURE — 97530 THERAPEUTIC ACTIVITIES: CPT

## 2018-11-05 PROCEDURE — 74011250637 HC RX REV CODE- 250/637: Performed by: PHYSICIAN ASSISTANT

## 2018-11-05 PROCEDURE — 74011000258 HC RX REV CODE- 258: Performed by: HOSPITALIST

## 2018-11-05 RX ORDER — DILTIAZEM HYDROCHLORIDE 180 MG/1
180 CAPSULE, COATED, EXTENDED RELEASE ORAL DAILY
Qty: 30 CAP | Refills: 0 | Status: SHIPPED | OUTPATIENT
Start: 2018-11-06

## 2018-11-05 RX ORDER — AMOXICILLIN AND CLAVULANATE POTASSIUM 875; 125 MG/1; MG/1
1 TABLET, FILM COATED ORAL 2 TIMES DAILY
Qty: 14 TAB | Refills: 0 | Status: SHIPPED | OUTPATIENT
Start: 2018-11-05

## 2018-11-05 RX ADMIN — HYDROCODONE BITARTRATE AND ACETAMINOPHEN 1 TABLET: 5; 325 TABLET ORAL at 10:12

## 2018-11-05 RX ADMIN — SIMETHICONE CHEW TAB 80 MG 80 MG: 80 TABLET ORAL at 16:39

## 2018-11-05 RX ADMIN — SODIUM CHLORIDE 1000 MG: 900 INJECTION, SOLUTION INTRAVENOUS at 05:22

## 2018-11-05 RX ADMIN — PANTOPRAZOLE SODIUM 40 MG: 40 TABLET, DELAYED RELEASE ORAL at 09:41

## 2018-11-05 RX ADMIN — DILTIAZEM HYDROCHLORIDE 180 MG: 180 CAPSULE, COATED, EXTENDED RELEASE ORAL at 09:41

## 2018-11-05 RX ADMIN — LUBIPROSTONE 24 MCG: 24 CAPSULE, GELATIN COATED ORAL at 09:40

## 2018-11-05 RX ADMIN — DOCUSATE SODIUM 100 MG: 100 CAPSULE, LIQUID FILLED ORAL at 09:40

## 2018-11-05 RX ADMIN — HYDROCODONE BITARTRATE AND ACETAMINOPHEN 1 TABLET: 5; 325 TABLET ORAL at 16:39

## 2018-11-05 RX ADMIN — ACETAMINOPHEN 650 MG: 325 TABLET ORAL at 00:00

## 2018-11-05 RX ADMIN — ATORVASTATIN CALCIUM 20 MG: 20 TABLET, FILM COATED ORAL at 09:41

## 2018-11-05 RX ADMIN — PIPERACILLIN SODIUM,TAZOBACTAM SODIUM 3.38 G: 3; .375 INJECTION, POWDER, FOR SOLUTION INTRAVENOUS at 02:04

## 2018-11-05 RX ADMIN — LUBIPROSTONE 24 MCG: 24 CAPSULE, GELATIN COATED ORAL at 16:39

## 2018-11-05 RX ADMIN — ACETAMINOPHEN 650 MG: 325 TABLET ORAL at 09:41

## 2018-11-05 RX ADMIN — SIMETHICONE CHEW TAB 80 MG 80 MG: 80 TABLET ORAL at 09:41

## 2018-11-05 RX ADMIN — MULTIPLE VITAMINS W/ MINERALS TAB 1 TABLET: TAB at 09:40

## 2018-11-05 NOTE — PROGRESS NOTES
Problem: Falls - Risk of  Goal: *Absence of Falls  Document Zack Fall Risk and appropriate interventions in the flowsheet.   Outcome: Progressing Towards Goal  Fall Risk Interventions:  Mobility Interventions: Assess mobility with egress test, Patient to call before getting OOB, Utilize walker, cane, or other assistive device         Medication Interventions: Patient to call before getting OOB, Teach patient to arise slowly    Elimination Interventions: Patient to call for help with toileting needs, Call light in reach

## 2018-11-05 NOTE — PROGRESS NOTES
Transition of care: home today  Met with patient at bedside. Patient states she is d/c home her  will pick her up at 5-6 pm. Patient would like to use [facility] referral has been placed. She does use a walker and has one. Denies other needs   [facility]    Next steps: Follow up    [phone]    Chosen to continue managing your healthcare needs. Follow up with JOSE Daigle    Specialty: Internal Medicine    [address]   [address] [address] [phone]    Patient will call to schedule this appointment. Follow up with Francisca Argueta MD    Specialty: Cardiology, Internal Medicine    [address]   [phone]      Follow up with Luther Montalvo MD on 11/13/2018    Specialty: Orthopedic Surgery    [address] [facility] and [address] [facility] [address] [phone]    Follow-up appointment @ 1:45 p.m. Care Management Interventions  PCP Verified by CM: Yes  Mode of Transport at Discharge:  Other (see comment)(family)  Transition of Care Consult (CM Consult): Discharge Planning, [address]: No  Reason Outside [place]: Patient already serviced by other home care/hospice agency(personal touch)  Current Support Network: Lives with Spouse  Confirm Follow Up Transport: Family  Plan discussed with Pt/Family/Caregiver: Yes  Freedom of Choice Offered: Yes  Discharge Location  Discharge Placement: Home with home health

## 2018-11-05 NOTE — DISCHARGE SUMMARY
Discharge Summary    Patient: Chance Palma MRN: [id number]  CSN: [id number]    YOB: 1940  Age: 70 y.o.   Sex: female    DOA: 11/1/2018 LOS:  LOS: 3 days   Discharge Date:      Primary Care Provider:  JOSE Balderas    Admission Diagnoses: Fever in adult  Hypotension  S/P laminectomy with spinal fusion  SIRS (systemic inflammatory response syndrome) ([facility]    Discharge Diagnoses:    Problem List as of 11/5/2018 Date Reviewed: 10/30/2018          Codes Class Noted - Resolved    Constipation ICD-10-CM: K59.00  ICD-9-CM: 564.00  11/2/2018 - Present        Anemia due to acute blood loss ICD-10-CM: D62  ICD-9-CM: 285.1  11/2/2018 - Present        Positive D dimer ICD-10-CM: R79.89  ICD-9-CM: 790.92  11/2/2018 - Present        Pain of left lower extremity ICD-10-CM: M79.605  ICD-9-CM: 729.5  11/2/2018 - Present        Fever ICD-10-CM: R50.9  ICD-9-CM: 780.60  11/1/2018 - Present        * (Principal) SIRS (systemic inflammatory response syndrome) ([facility] ICD-10-CM: R65.10  ICD-9-CM: 995.90  11/1/2018 - Present        GERD (gastroesophageal reflux disease) ICD-10-CM: K21.9  ICD-9-CM: 530.81  11/1/2018 - Present        PUD (peptic ulcer disease) ICD-10-CM: K27.9  ICD-9-CM: 533.90  11/1/2018 - Present        Hypokalemia ICD-10-CM: E87.6  ICD-9-CM: 276.8  11/1/2018 - Present        Hypotension ICD-10-CM: I95.9  ICD-9-CM: 458.9  11/1/2018 - Present        S/P laminectomy with spinal fusion ICD-10-CM: Z98.1  ICD-9-CM: V45.4  11/1/2018 - Present        Fever in adult ICD-10-CM: R50.9  ICD-9-CM: 780.60  11/1/2018 - Present        DDD (degenerative disc disease), lumbar ICD-10-CM: M51.36  ICD-9-CM: 722.52  10/17/2018 - Present        History of total hip arthroplasty ICD-10-CM: Z96.649  ICD-9-CM: V43.64  3/6/2014 - Present    Overview Signed 3/6/2014  4:05 PM by Nancy Friends     Per Dr. Vines Arrow: Spinal stenosis of lumbar region with neurogenic claudication ICD-10-CM: O02.230  ICD-9-CM: 724.03  10/17/2018 - 10/31/2018        RESOLVED: HNP (herniated nucleus pulposus), lumbar ICD-10-CM: M51.26  ICD-9-CM: 722.10  10/17/2018 - 10/31/2018        RESOLVED: Failed total hip arthroplasty ([facility] ICD-10-CM: T84.018A, Z96.649  ICD-9-CM: 996.47, V43.64  3/6/2014 - 3/10/2014    Overview Signed 3/6/2014  4:06 PM by Adore ZAMAN     Original surgery per Dr. dS Nair                   Discharge Medications:     Current Discharge Medication List      START taking these medications    Details   dilTIAZem CD (CARDIZEM CD) 180 mg ER capsule Take 1 Cap by mouth daily. Qty: 30 Cap, Refills: 0      amoxicillin-clavulanate (AUGMENTIN) 875-125 mg per tablet Take 1 Tab by mouth two (2) times a day. Qty: 14 Tab, Refills: 0         CONTINUE these medications which have NOT CHANGED    Details   diazePAM (VALIUM) 5 mg tablet Take 0.5 Tabs by mouth three (3) times daily as needed. Max Daily Amount: 7.5 mg.  Qty: 60 Tab, Refills: 0    Associated Diagnoses: Spinal stenosis of lumbar region with neurogenic claudication      HYDROcodone-acetaminophen (NORCO) 5-325 mg per tablet Take 1-1.5 Tabs by mouth every four (4) hours as needed. Max Daily Amount: 9 Tabs. Qty: 84 Tab, Refills: 0    Associated Diagnoses: Spinal stenosis of lumbar region with neurogenic claudication      lubiPROStone (AMITIZA) 24 mcg capsule Take 1 Cap by mouth two (2) times daily (with meals) for 30 days. Qty: 60 Cap, Refills: 0    Associated Diagnoses: Spinal stenosis of lumbar region with neurogenic claudication      naloxone (NARCAN) 4 mg/actuation nasal spray Use 1 spray intranasally, then discard. Repeat with new spray every 2 min as needed for opioid overdose symptoms, alternating nostrils. Qty: 1 Each, Refills: 0      metoprolol succinate (TOPROL-XL) 25 mg XL tablet Take 25 mg by mouth nightly. clobetasol (CLOBEX) 0.05 % lotion Apply  to affected area two (2) times a day.       dupilumab (DUPIXENT) 300 mg/2 mL syrg syringe 300 mg by SubCUTAneous route every fourteen (14) days. lisinopril (PRINIVIL, ZESTRIL) 5 mg tablet Take 2.5 mg by mouth daily. simvastatin (ZOCOR) 20 mg tablet Take  by mouth nightly. zoledronic acid (RECLAST) 5 mg/100 mL pgbk 5 mg by IntraVENous route once. ondansetron (ZOFRAN ODT) 4 mg disintegrating tablet Take 1 Tab by mouth every eight (8) hours as needed for Nausea. Qty: 6 Tab, Refills: 0      omeprazole (PRILOSEC) 40 mg capsule Take 40 mg by mouth two (2) times a day. Magnesium Oxide 500 mg cap Take 400 mg by mouth daily. atorvastatin (LIPITOR) 20 mg tablet Take 20 mg by mouth daily. LORazepam (ATIVAN) 0.5 mg tablet Take 0.5 mg by mouth nightly as needed for Anxiety. STOP taking these medications       Hydrochlorothiazide 12.5 mg tablet Comments:   Reason for Stopping:               Discharge Condition: Stable    Procedures : None    Consults: Orthopedics      PHYSICAL EXAM    Visit Vitals  /65 (BP 1 Location: Right arm, BP Patient Position: At rest)   Pulse 79   Temp 98.2 °F (36.8 °C)   Resp 16   Ht 5' 2\" (1.575 m)   Wt 64.4 kg (141 lb 15.7 oz)   SpO2 97%   Breastfeeding? No   BMI 25.97 kg/m²     General: Awake, cooperative, no acute distress    HEENT: NC, Atraumatic. PERRLA, EOMI. Anicteric sclerae. Lungs:  CTA Bilaterally. No Wheezing/Rhonchi/Rales. Heart:  Regular  rhythm,  No murmur, No Rubs, No Gallops  Abdomen: Soft, Non distended, Non tender. +Bowel sounds,   Extremities: No c/c/e  Psych:   Not anxious or agitated. Neurologic:  No acute neurological deficits. Admission HPI : Samy Alan is a 70 y.o. female who hx of HTN, gerd, d/c from ortho service after laminectomy per Dr. Kirby Wynn came to ER due to fever/dizziness. She was d/c home yesterday with low BP and low T. She continues to take pain meds with anti-htn medication at home.  She felt dizziness/fever/chills today and was found hypotension per PT. She had H580-309. BP 99/42. Ns bolus was given and bp improving. UA was clear, cxr results still pending. She reported pain in left leg worsen after the surgery and pain down to left calf. She denies any sob/chest pain. chronic coughs for months. Hospital Course : This patient was admitted to medical services on November 1, 2018 meeting SIRS criteria after lumbar surgery on 10/30/18. The patient was admitted to telemetry services for further care. Prior to admission a CT abdomen/pelvis was obtained, which was largely unremarkable. She complained of chest pain, so a D-Dimer was obtained, and returned elevated. A CTA chest was obtained and was negative for PE or any pneumonic consolidation. A lower extremity duplex was obtained and was negative for acute DVT. She was started on empiric antibiotics, and blood/urine cultures were obtained. Both of which remain sterile at the time of discharge. The patient's leukocytosis improved from admission, and she is hemodynamically stable at the time of discharge. She was seen by Dr Ирина Barrett, orthopedics, over the course of her hospitalization. He documented that she was stable from orthopedic standpoint, and to follow up in the office. She has an appointment already in place. The patient's hemoglobin continued to drop after admission, requiring a blood transfusion. She received 2 units of PRBCs, with appropriate response in her hemoglobin levels. Prior to discharge her hemoglobin level is improved and stable. During her hospital stay, the patient developed atrial fibrillation with a rapid ventricular rate. The patient was placed on a cardizem drip, given digoxin, and monitored. Her heart rate improved, and she was transitioned to oral cardizem, which she will receive upon discharge. She has seen Dr Lina Luke in the past and was advised to follow up with him upon discharge. She will be discharged today in stable condition with an extended course of antibiotics. She will be on Augmentin 875mg BID for 7 days. She has worked with PT and their recommendation is for home health care. Activity: PT/OT per Home Health    Diet: Regular Diet    Follow-up: PCP; Orthopedics; Cardiology    Disposition: Home Health    Minutes spent on discharge: 35       Labs: Results:       Chemistry Recent Labs     11/05/18 0305 11/04/18 0540 11/03/18 0215   GLU 85 86 89    138 140   K 4.1 3.9 3.4*   * 109* 105   CO2 22 20* 23   BUN 5* 6* 12   CREA 0.52* 0.49* 0.64   CA 7.6* 7.3* 7.2*   AGAP 11 9 12   BUCR 10 12 19      CBC w/Diff Recent Labs     11/05/18 0305 11/04/18 0540 11/03/18 0215   WBC 6.8 7.2 7.4   RBC 3.76* 3.42* 2.67*   HGB 10.3* 9.4* 7.2*   HCT 31.8* 29.0* 22.6*    224 202   GRANS 67 69 70   LYMPH 14* 10* 14*   EOS 5 9* 5      Cardiac Enzymes No results for input(s): CPK, CKND1, GAYLA in the last 72 hours. No lab exists for component: CKRMB, TROIP   Coagulation No results for input(s): PTP, INR, APTT in the last 72 hours. No lab exists for component: INREXT    Lipid Panel No results found for: CHOL, CHOLPOCT, CHOLX, CHLST, CHOLV, 946620, HDL, LDL, LDLC, DLDLP, 640264, VLDLC, VLDL, TGLX, TRIGL, TRIGP, TGLPOCT, CHHD, CHHDX   BNP No results for input(s): BNPP in the last 72 hours. Liver Enzymes No results for input(s): TP, ALB, TBIL, AP, SGOT, GPT in the last 72 hours. No lab exists for component: DBIL   Thyroid Studies No results found for: T4, T3U, TSH, TSHEXT         Significant Diagnostic Studies: Cta Chest W Or W Wo Cont    Result Date: 11/2/2018  EXAM: CTA chest INDICATION: Pain. Elevated d-dimer. COMPARISON: Correlation made with CT abdomen performed November 1, 2018. TECHNIQUE: Axial CT imaging from the thoracic inlet through the diaphragm with intravenous contrast. Coronal and sagittal MIP reformats were generated.  Dose reduction techniques used: automated exposure control, adjustment of the mAs and/or kVp according to patient size, and iterative reconstruction techniques. _______________ FINDINGS: EXAM QUALITY: Adequate PULMONARY ARTERIES: No evidence of pulmonary embolism. MEDIASTINUM: Normal heart size. No evidence of right heart strain. Aorta is unremarkable. No pericardial effusion. LUNGS: There are trace/small pleural effusions with associated minimal consolidation. PLEURA: Normal. AIRWAY: Normal. LYMPH NODES: Small nonspecific mediastinal lymph nodes are noted. UPPER ABDOMEN: There is a stable cyst upper pole left kidney. There is a small hiatal hernia. There is dense material within the gallbladder suggesting vicarious excretion of contrast. OTHER: No acute or aggressive osseous abnormalities identified. _______________     IMPRESSION: 1. No evidence of pulmonary embolism. 2.  Trace/small pleural effusions with associated minimal consolidation most consistent with atelectasis. Ct Abd Pelv W Cont    Result Date: 11/1/2018  EXAM: CT of the abdomen and pelvis INDICATION: Postop back surgery fever COMPARISON: September 30, 2018 TECHNIQUE: Axial CT imaging of the abdomen and pelvis was performed with intravenous contrast. Multiplanar reformats were generated. One or more dose reduction techniques were used on this CT: automated exposure control, adjustment of the mAs and/or kVp according to patient size, and iterative reconstruction techniques. The specific techniques used on this CT exam have been documented in the patient's electronic medical record. _______________ FINDINGS: LOWER CHEST: Minimal right and left pleural fluid seen. Lung bases are clear. There is a small hiatal hernia. LIVER, BILIARY: There is a 7 mm low-attenuation in the right hepatic lobe suggesting a small cyst. No biliary dilation. Gallbladder is unremarkable. PANCREAS: Normal. SPLEEN: Normal. ADRENALS: Normal. KIDNEYS: There is a 3.4 cm cyst the midpole the left kidney. Other smaller cysts are seen in the left kidney.  There are small cysts in the upper pole the right kidney and midpole the right kidney. There is a 4 mm nonobstructive calculus in the midpole the right kidney. Parapelvic cysts are seen in the left kidney. Kidneys demonstrate no hydronephrosis or hydroureter. VASCULATURE: Mild calcific atherosclerosis present. LYMPH NODES: No enlarged lymph nodes. GASTROINTESTINAL TRACT: No bowel dilation or wall thickening. Small hiatal hernia present. There is no bowel obstruction. Appendix is not visualized. Mild colonic diverticulosis present. PELVIC ORGANS: There are beam hardening artifacts from right hip arthroplasty limiting evaluation the pelvis. Uterus is seen. Minimal free fluid is present in the right hemipelvis. There is gas in the urinary bladder which may reflect instrumentation versus emphysematous cystitis. BONES: No acute or aggressive osseous abnormalities identified. There is osteopenia. There are posterior pedicular screws and rods from level of L4-S1. There is grade 1 anterolisthesis at L4-L5 likely degenerative. There is no loosening of the hardware. Endplate discogenic changes seen at L2-L3. There are postlaminectomy changes at L4-L5. And staples are present. There is stranding of the subcutaneous tissues of the back at the operative site likely postsurgical. Please note evaluation for epidural abscess or fluid collection is limited on CT. If that is of concern then I would recommend MRI for further evaluation. OTHER: None. _______________     IMPRESSION: There are postoperative changes from laminectomy and posterior fusion of the lumbar spine. Evaluation for epidural abscess or fluid collection is limited on CT. If that is of concern then I would recommend MRI for further evaluation.  Gas in the urinary bladder which may reflect instrumentation versus emphysematous cystitis Small hiatal hernia Colonic diverticulosis    Xr Chest Port    Result Date: 11/2/2018  --------------------------------------------------------------------------- <<<<<<<<< [facility]           >>>>>>>>> --------------------------------------------------------------------------- CLINICAL HISTORY:  Fever. COMPARISON EXAMINATIONS:  September 30, 2018. ---  SINGLE FRONTAL VIEW OF THE CHEST  --- The lungs and pleural spaces are clear. The cardiomediastinal silhouette is stable. There is mild curvature of the lower thoracic spine to the left. --------------    Impression: -------------- No active pulmonary disease. Gene Ruiz Technologist Service    Result Date: 11/1/2018  See impression. Impression:  Fluoroscopy was provided for this procedure under the supervision and/or direction of the attending provider. ? For further information regarding this procedure, see patient medical record. No results found for this or any previous visit.         CC: [facility] Sofia [facility]

## 2018-11-05 NOTE — PROGRESS NOTES
Patient PIV lost and unable to get morning Zosyn. JOSE Skelton notified. Will continue to monitor. Patient stable.

## 2018-11-05 NOTE — PROGRESS NOTES
Awaiting patients ride home for discharge. Patients  will be here at approx. 1700. Will continue to monitor. Patient stable.

## 2018-11-05 NOTE — PROGRESS NOTES
Problem: Mobility Impaired (Adult and Pediatric)  Goal: *Acute Goals and Plan of Care (Insert Text)  Physical Therapy Goals   Initiated 11/2/2018 and to be accomplished within 5-7 day(s)  1. Patient will move from supine <> sit with S in prep for out of bed activity and change of position. 2.  Patient will perform sit<> stand with S with LRAD in prep for transfers/ambulation. 3.  Patient will transfer from bed <> chair with S with LRAD for time up in chair for completion of ADL activity. 4.  Patient will ambulate 150 feet with LRAD/S for improved functional mobility/safe discharge. 5.  Patient will ascend/descend 3-5 stairs with B/L handrails with minimal assistance/contact guard assist for home re-entry as needed. Pt refused PT due to:  []  Nausea/vomiting  []  Eating  []  Pain  [x]  Pt lethargic. \" I just got comfortable, please just come back tomorrow. \"  []  Off Unit  Will f/u tomorrow. Thank you.   Lianet Harley, PTA

## 2018-11-05 NOTE — ROUTINE PROCESS
Bedside and Verbal shift change report given to [address] [place] [address] (oncoming nurse) by Ron Lozano (offgoing nurse). Report included the following information SBAR, MAR, Accordion and Cardiac Rhythm NSR.

## 2018-11-05 NOTE — DISCHARGE INSTRUCTIONS
DISCHARGE SUMMARY from Nurse    PATIENT INSTRUCTIONS:    After general anesthesia or intravenous sedation, for 24 hours or while taking prescription Narcotics:  · Limit your activities  · Do not drive and operate hazardous machinery  · Do not make important personal or business decisions  · Do  not drink alcoholic beverages  · If you have not urinated within 8 hours after discharge, please contact your surgeon on call. Report the following to your surgeon:  · Excessive pain, swelling, redness or odor of or around the surgical area  · Temperature over 100.5  · Nausea and vomiting lasting longer than 4 hours or if unable to take medications  · Any signs of decreased circulation or nerve impairment to extremity: change in color, persistent  numbness, tingling, coldness or increase pain  · Any questions    What to do at Home:  Recommended activity: Activity as tolerated with brace intact. *  Please give a list of your current medications to your Primary Care Provider. *  Please update this list whenever your medications are discontinued, doses are      changed, or new medications (including over-the-counter products) are added. *  Please carry medication information at all times in case of emergency situations. These are general instructions for a healthy lifestyle:    No smoking/ No tobacco products/ Avoid exposure to second hand smoke  Surgeon General's Warning:  Quitting smoking now greatly reduces serious risk to your health. Obesity, smoking, and sedentary lifestyle greatly increases your risk for illness    A healthy diet, regular physical exercise & weight monitoring are important for maintaining a healthy lifestyle    You may be retaining fluid if you have a history of heart failure or if you experience any of the following symptoms:  Weight gain of 3 pounds or more overnight or 5 pounds in a week, increased swelling in our hands or feet or shortness of breath while lying flat in bed.   Please call your doctor as soon as you notice any of these symptoms; do not wait until your next office visit. Recognize signs and symptoms of STROKE:    F-face looks uneven    A-arms unable to move or move unevenly    S-speech slurred or non-existent    T-time-call 911 as soon as signs and symptoms begin-DO NOT go       Back to bed or wait to see if you get better-TIME IS BRAIN. Warning Signs of HEART ATTACK     Call 911 if you have these symptoms:   Chest discomfort. Most heart attacks involve discomfort in the center of the chest that lasts more than a few minutes, or that goes away and comes back. It can feel like uncomfortable pressure, squeezing, fullness, or pain.  Discomfort in other areas of the upper body. Symptoms can include pain or discomfort in one or both arms, the back, neck, jaw, or stomach.  Shortness of breath with or without chest discomfort.  Other signs may include breaking out in a cold sweat, nausea, or lightheadedness. Don't wait more than five minutes to call 911 - MINUTES MATTER! Fast action can save your life. Calling 911 is almost always the fastest way to get lifesaving treatment. Emergency Medical Services staff can begin treatment when they arrive -- up to an hour sooner than if someone gets to the hospital by car. The discharge information has been reviewed with the patient. The patient verbalized understanding. Discharge medications reviewed with the patient and appropriate educational materials and side effects teaching were provided.   ___________________________________________________________________________________________________________________________________

## 2018-11-05 NOTE — ROUTINE PROCESS
Bedside and Verbal shift change report given to Linda Del Toro RN and Kael Cruz RN (oncoming nurse) by Kiran Villalpando (offgoing nurse). Report included the following information SBAR, Accordion and Cardiac Rhythm NSR.

## 2018-11-05 NOTE — PROGRESS NOTES
0000- Tylenol given for elevated temp of 100.6    0103- Temp recheck of 100.1    0215- temp recheck of 98.4    0627- Patient had an uneventful night and resting quietly in bed at this time. Call bell within reach and lights dimmed.

## 2018-11-05 NOTE — PROGRESS NOTES
Problem: Mobility Impaired (Adult and Pediatric)  Goal: *Acute Goals and Plan of Care (Insert Text)  Physical Therapy Goals   Initiated 11/2/2018 and to be accomplished within 5-7 day(s)  1. Patient will move from supine <> sit with S in prep for out of bed activity and change of position. 2.  Patient will perform sit<> stand with S with LRAD in prep for transfers/ambulation. 3.  Patient will transfer from bed <> chair with S with LRAD for time up in chair for completion of ADL activity. 4.  Patient will ambulate 150 feet with LRAD/S for improved functional mobility/safe discharge. 5.  Patient will ascend/descend 3-5 stairs with B/L handrails with minimal assistance/contact guard assist for home re-entry as needed. Outcome: Progressing Towards Goal  physical Therapy TREATMENT    Patient: Connie Means (43 y.o. female)  Date: 11/5/2018  Diagnosis: Fever in adult  Hypotension  S/P laminectomy with spinal fusion  SIRS (systemic inflammatory response syndrome) (HCC) SIRS (systemic inflammatory response syndrome) (HCC)      Precautions: Back, Fall, Spinal   Chart, physical therapy assessment, plan of care and goals were reviewed. ASSESSMENT:  Pt seen sitting at the EOB prior to session w/ telemonitor donned. Pt continues to c/o L LE pain which sxs subside when walking. Pt's brace remains poorly fitted and only able to don brace when standing. Pt able to increase in gait distance w/ RW w/o any difficulty. Pt transferred back to room after session where pt was left in supine, call bell and tray in reach, nurse notified after session. Progression toward goals:  [x]      Improving appropriately and progressing toward goals  []      Improving slowly and progressing toward goals  []      Not making progress toward goals and plan of care will be adjusted     PLAN:  Patient continues to benefit from skilled intervention to address the above impairments.   Continue treatment per established plan of care.  Discharge Recommendations:  Home Health  Further Equipment Recommendations for Discharge:  LSO     SUBJECTIVE:   Patient stated I feel pretty okay my leg still gives me problems.     OBJECTIVE DATA SUMMARY:   Critical Behavior:  Neurologic State: Alert, Appropriate for age  Orientation Level: Appropriate for age, Oriented X4  Safety/Judgement: Awareness of environment, Fall prevention  Functional Mobility Training:  Bed Mobility:  Rolling: Supervision  Sit to Supine: Supervision  Scooting: Supervision  Transfers:  Sit to Stand: Supervision;Stand-by assistance  Stand to Sit: Supervision;Stand-by assistance  Balance:  Sitting: Intact  Standing: Intact; With support  Ambulation/Gait Training:  Distance (ft): 400 Feet (ft)  Assistive Device: Brace/Splint;Gait belt;Walker, rolling  Ambulation - Level of Assistance: Supervision;Stand-by assistance  Gait Abnormalities: Decreased step clearance  Base of Support: Narrowed  Speed/Milena: Slow  Step Length: Left shortened;Right shortened  Swing Pattern: Left asymmetrical;Right asymmetrical  Pain:  Pain Scale 1: Numeric (0 - 10)  Pain Intensity 1: 0  Pain Location 1: Back  Pain Description 1: Aching  Pain Intervention(s) 1: Medication (see MAR); Rest;Repositioned  Activity Tolerance:   Good  Please refer to the flowsheet for vital signs taken during this treatment.   After treatment:   [] Patient left in no apparent distress sitting up in chair  [x] Patient left in no apparent distress in bed  [x] Call bell left within reach  [x] Nursing notified  [] Caregiver present  [] Bed alarm activated      Dar Abraham PT   Time Calculation: 34 mins

## 2018-11-07 ENCOUNTER — HOME CARE VISIT (OUTPATIENT)
Dept: SCHEDULING | Facility: HOME HEALTH | Age: 78
End: 2018-11-07
Payer: MEDICARE

## 2018-11-07 ENCOUNTER — HOME CARE VISIT (OUTPATIENT)
Dept: HOME HEALTH SERVICES | Facility: HOME HEALTH | Age: 78
End: 2018-11-07

## 2018-11-07 VITALS
DIASTOLIC BLOOD PRESSURE: 67 MMHG | HEIGHT: 62 IN | HEART RATE: 77 BPM | WEIGHT: 142 LBS | SYSTOLIC BLOOD PRESSURE: 134 MMHG | RESPIRATION RATE: 16 BRPM | OXYGEN SATURATION: 98 % | BODY MASS INDEX: 26.13 KG/M2 | TEMPERATURE: 98.5 F

## 2018-11-07 LAB
BACTERIA SPEC CULT: NORMAL
BACTERIA SPEC CULT: NORMAL
SERVICE CMNT-IMP: NORMAL
SERVICE CMNT-IMP: NORMAL

## 2018-11-07 PROCEDURE — 3331090002 HH PPS REVENUE DEBIT

## 2018-11-07 PROCEDURE — G0299 HHS/HOSPICE OF RN EA 15 MIN: HCPCS

## 2018-11-07 PROCEDURE — 3331090001 HH PPS REVENUE CREDIT

## 2018-11-07 PROCEDURE — 400013 HH SOC

## 2018-11-08 ENCOUNTER — HOME CARE VISIT (OUTPATIENT)
Dept: HOME HEALTH SERVICES | Facility: HOME HEALTH | Age: 78
End: 2018-11-08
Payer: MEDICARE

## 2018-11-08 ENCOUNTER — HOME CARE VISIT (OUTPATIENT)
Dept: SCHEDULING | Facility: HOME HEALTH | Age: 78
End: 2018-11-08
Payer: MEDICARE

## 2018-11-08 VITALS
SYSTOLIC BLOOD PRESSURE: 123 MMHG | HEART RATE: 77 BPM | TEMPERATURE: 98.7 F | OXYGEN SATURATION: 97 % | DIASTOLIC BLOOD PRESSURE: 69 MMHG | RESPIRATION RATE: 17 BRPM

## 2018-11-08 PROCEDURE — G0151 HHCP-SERV OF PT,EA 15 MIN: HCPCS

## 2018-11-08 PROCEDURE — 3331090001 HH PPS REVENUE CREDIT

## 2018-11-08 PROCEDURE — 3331090002 HH PPS REVENUE DEBIT

## 2018-11-09 ENCOUNTER — HOME CARE VISIT (OUTPATIENT)
Dept: HOME HEALTH SERVICES | Facility: HOME HEALTH | Age: 78
End: 2018-11-09
Payer: MEDICARE

## 2018-11-09 ENCOUNTER — HOME CARE VISIT (OUTPATIENT)
Dept: SCHEDULING | Facility: HOME HEALTH | Age: 78
End: 2018-11-09
Payer: MEDICARE

## 2018-11-09 VITALS
TEMPERATURE: 97.7 F | OXYGEN SATURATION: 96 % | SYSTOLIC BLOOD PRESSURE: 118 MMHG | HEART RATE: 80 BPM | DIASTOLIC BLOOD PRESSURE: 64 MMHG

## 2018-11-09 PROCEDURE — 3331090002 HH PPS REVENUE DEBIT

## 2018-11-09 PROCEDURE — G0157 HHC PT ASSISTANT EA 15: HCPCS

## 2018-11-09 PROCEDURE — G0152 HHCP-SERV OF OT,EA 15 MIN: HCPCS

## 2018-11-09 PROCEDURE — 3331090001 HH PPS REVENUE CREDIT

## 2018-11-10 ENCOUNTER — HOME CARE VISIT (OUTPATIENT)
Dept: SCHEDULING | Facility: HOME HEALTH | Age: 78
End: 2018-11-10
Payer: MEDICARE

## 2018-11-10 VITALS
TEMPERATURE: 97.2 F | OXYGEN SATURATION: 97 % | HEART RATE: 76 BPM | RESPIRATION RATE: 16 BRPM | SYSTOLIC BLOOD PRESSURE: 140 MMHG | DIASTOLIC BLOOD PRESSURE: 70 MMHG

## 2018-11-10 PROCEDURE — 3331090002 HH PPS REVENUE DEBIT

## 2018-11-10 PROCEDURE — G0299 HHS/HOSPICE OF RN EA 15 MIN: HCPCS

## 2018-11-10 PROCEDURE — 3331090001 HH PPS REVENUE CREDIT

## 2018-11-11 PROCEDURE — 3331090001 HH PPS REVENUE CREDIT

## 2018-11-11 PROCEDURE — 3331090002 HH PPS REVENUE DEBIT

## 2018-11-12 ENCOUNTER — HOME CARE VISIT (OUTPATIENT)
Dept: SCHEDULING | Facility: HOME HEALTH | Age: 78
End: 2018-11-12
Payer: MEDICARE

## 2018-11-12 ENCOUNTER — HOME CARE VISIT (OUTPATIENT)
Dept: HOME HEALTH SERVICES | Facility: HOME HEALTH | Age: 78
End: 2018-11-12
Payer: MEDICARE

## 2018-11-12 VITALS — DIASTOLIC BLOOD PRESSURE: 70 MMHG | OXYGEN SATURATION: 98 % | HEART RATE: 86 BPM | SYSTOLIC BLOOD PRESSURE: 122 MMHG

## 2018-11-12 VITALS
SYSTOLIC BLOOD PRESSURE: 136 MMHG | TEMPERATURE: 97.8 F | DIASTOLIC BLOOD PRESSURE: 78 MMHG | OXYGEN SATURATION: 98 % | HEART RATE: 79 BPM

## 2018-11-12 PROCEDURE — G0152 HHCP-SERV OF OT,EA 15 MIN: HCPCS

## 2018-11-12 PROCEDURE — 3331090001 HH PPS REVENUE CREDIT

## 2018-11-12 PROCEDURE — 3331090002 HH PPS REVENUE DEBIT

## 2018-11-12 PROCEDURE — G0157 HHC PT ASSISTANT EA 15: HCPCS

## 2018-11-13 VITALS — HEART RATE: 84 BPM | SYSTOLIC BLOOD PRESSURE: 125 MMHG | OXYGEN SATURATION: 97 % | DIASTOLIC BLOOD PRESSURE: 82 MMHG

## 2018-11-13 PROCEDURE — 3331090001 HH PPS REVENUE CREDIT

## 2018-11-13 PROCEDURE — 3331090002 HH PPS REVENUE DEBIT

## 2018-11-14 ENCOUNTER — HOME CARE VISIT (OUTPATIENT)
Dept: SCHEDULING | Facility: HOME HEALTH | Age: 78
End: 2018-11-14
Payer: MEDICARE

## 2018-11-14 VITALS
SYSTOLIC BLOOD PRESSURE: 127 MMHG | TEMPERATURE: 97.5 F | DIASTOLIC BLOOD PRESSURE: 66 MMHG | HEART RATE: 78 BPM | OXYGEN SATURATION: 97 % | RESPIRATION RATE: 14 BRPM

## 2018-11-14 PROCEDURE — 3331090001 HH PPS REVENUE CREDIT

## 2018-11-14 PROCEDURE — G0299 HHS/HOSPICE OF RN EA 15 MIN: HCPCS

## 2018-11-14 PROCEDURE — 3331090002 HH PPS REVENUE DEBIT

## 2018-11-15 ENCOUNTER — HOME CARE VISIT (OUTPATIENT)
Dept: SCHEDULING | Facility: HOME HEALTH | Age: 78
End: 2018-11-15
Payer: MEDICARE

## 2018-11-15 VITALS
HEART RATE: 79 BPM | SYSTOLIC BLOOD PRESSURE: 122 MMHG | TEMPERATURE: 97.7 F | DIASTOLIC BLOOD PRESSURE: 64 MMHG | OXYGEN SATURATION: 98 %

## 2018-11-15 PROCEDURE — G0152 HHCP-SERV OF OT,EA 15 MIN: HCPCS

## 2018-11-15 PROCEDURE — G0157 HHC PT ASSISTANT EA 15: HCPCS

## 2018-11-15 PROCEDURE — 3331090002 HH PPS REVENUE DEBIT

## 2018-11-15 PROCEDURE — 3331090001 HH PPS REVENUE CREDIT

## 2018-11-16 VITALS — SYSTOLIC BLOOD PRESSURE: 125 MMHG | DIASTOLIC BLOOD PRESSURE: 80 MMHG

## 2018-11-16 PROCEDURE — 3331090001 HH PPS REVENUE CREDIT

## 2018-11-16 PROCEDURE — 3331090002 HH PPS REVENUE DEBIT

## 2018-11-17 PROCEDURE — 3331090002 HH PPS REVENUE DEBIT

## 2018-11-17 PROCEDURE — 3331090001 HH PPS REVENUE CREDIT

## 2018-11-18 PROCEDURE — 3331090001 HH PPS REVENUE CREDIT

## 2018-11-18 PROCEDURE — 3331090002 HH PPS REVENUE DEBIT

## 2018-11-19 ENCOUNTER — HOME CARE VISIT (OUTPATIENT)
Dept: HOME HEALTH SERVICES | Facility: HOME HEALTH | Age: 78
End: 2018-11-19
Payer: MEDICARE

## 2018-11-19 PROCEDURE — 3331090002 HH PPS REVENUE DEBIT

## 2018-11-19 PROCEDURE — 3331090001 HH PPS REVENUE CREDIT

## 2018-11-19 PROCEDURE — G0157 HHC PT ASSISTANT EA 15: HCPCS

## 2018-11-20 ENCOUNTER — HOME CARE VISIT (OUTPATIENT)
Dept: SCHEDULING | Facility: HOME HEALTH | Age: 78
End: 2018-11-20
Payer: MEDICARE

## 2018-11-20 VITALS
TEMPERATURE: 97.1 F | SYSTOLIC BLOOD PRESSURE: 128 MMHG | HEART RATE: 73 BPM | DIASTOLIC BLOOD PRESSURE: 75 MMHG | OXYGEN SATURATION: 98 %

## 2018-11-20 PROCEDURE — 3331090002 HH PPS REVENUE DEBIT

## 2018-11-20 PROCEDURE — G0151 HHCP-SERV OF PT,EA 15 MIN: HCPCS

## 2018-11-20 PROCEDURE — 3331090001 HH PPS REVENUE CREDIT

## 2018-11-21 VITALS
OXYGEN SATURATION: 99 % | TEMPERATURE: 97.6 F | SYSTOLIC BLOOD PRESSURE: 131 MMHG | DIASTOLIC BLOOD PRESSURE: 69 MMHG | HEART RATE: 86 BPM | RESPIRATION RATE: 16 BRPM

## 2018-12-25 LAB
ATRIAL RATE: 91 BPM
CALCULATED P AXIS, ECG09: 49 DEGREES
CALCULATED R AXIS, ECG10: 48 DEGREES
CALCULATED T AXIS, ECG11: 92 DEGREES
DIAGNOSIS, 93000: NORMAL
P-R INTERVAL, ECG05: 162 MS
Q-T INTERVAL, ECG07: 410 MS
QRS DURATION, ECG06: 142 MS
QTC CALCULATION (BEZET), ECG08: 504 MS
VENTRICULAR RATE, ECG03: 91 BPM

## 2019-01-29 LAB
ATRIAL RATE: 93 BPM
CALCULATED R AXIS, ECG10: 14 DEGREES
CALCULATED T AXIS, ECG11: -179 DEGREES
DIAGNOSIS, 93000: NORMAL
Q-T INTERVAL, ECG07: 342 MS
QRS DURATION, ECG06: 138 MS
QTC CALCULATION (BEZET), ECG08: 546 MS
VENTRICULAR RATE, ECG03: 153 BPM

## 2022-03-18 PROBLEM — R50.9 FEVER: Status: ACTIVE | Noted: 2018-11-01

## 2022-03-18 PROBLEM — Z98.1 S/P LAMINECTOMY WITH SPINAL FUSION: Status: ACTIVE | Noted: 2018-11-01

## 2022-03-18 PROBLEM — K21.9 GERD (GASTROESOPHAGEAL REFLUX DISEASE): Status: ACTIVE | Noted: 2018-11-01

## 2022-03-18 PROBLEM — M79.605 PAIN OF LEFT LOWER EXTREMITY: Status: ACTIVE | Noted: 2018-11-02

## 2022-03-18 PROBLEM — D62 ANEMIA DUE TO ACUTE BLOOD LOSS: Status: ACTIVE | Noted: 2018-11-02

## 2022-03-19 PROBLEM — K27.9 PUD (PEPTIC ULCER DISEASE): Status: ACTIVE | Noted: 2018-11-01

## 2022-03-19 PROBLEM — R65.10 SIRS (SYSTEMIC INFLAMMATORY RESPONSE SYNDROME) (HCC): Status: ACTIVE | Noted: 2018-11-01

## 2022-03-19 PROBLEM — I95.9 HYPOTENSION: Status: ACTIVE | Noted: 2018-11-01

## 2022-03-19 PROBLEM — R50.9 FEVER IN ADULT: Status: ACTIVE | Noted: 2018-11-01

## 2022-03-19 PROBLEM — R79.89 POSITIVE D DIMER: Status: ACTIVE | Noted: 2018-11-02

## 2022-03-20 PROBLEM — M51.369 DDD (DEGENERATIVE DISC DISEASE), LUMBAR: Status: ACTIVE | Noted: 2018-10-17

## 2022-03-20 PROBLEM — K59.00 CONSTIPATION: Status: ACTIVE | Noted: 2018-11-02

## 2022-03-20 PROBLEM — E87.6 HYPOKALEMIA: Status: ACTIVE | Noted: 2018-11-01

## 2025-03-05 ENCOUNTER — HOSPITAL ENCOUNTER (OUTPATIENT)
Facility: HOSPITAL | Age: 85
Discharge: HOME | End: 2025-03-08
Payer: MEDICARE

## 2025-03-05 VITALS — WEIGHT: 130 LBS | HEIGHT: 62 IN | BODY MASS INDEX: 23.92 KG/M2

## 2025-03-05 DIAGNOSIS — Z01.818 PREOP TESTING: Primary | ICD-10-CM

## 2025-03-05 LAB
ALBUMIN SERPL-MCNC: 3.3 G/DL (ref 3.4–5)
ALBUMIN/GLOB SERPL: 1.1 (ref 0.8–1.7)
ALP SERPL-CCNC: 113 U/L (ref 45–117)
ALT SERPL-CCNC: 22 U/L (ref 13–56)
ANION GAP SERPL CALC-SCNC: 2 MMOL/L (ref 3–18)
APTT PPP: 27.8 SEC (ref 23–36.4)
AST SERPL-CCNC: 26 U/L (ref 10–38)
BASOPHILS # BLD: 0.05 K/UL (ref 0–0.1)
BASOPHILS NFR BLD: 0.9 % (ref 0–2)
BILIRUB SERPL-MCNC: 0.2 MG/DL (ref 0.2–1)
BUN SERPL-MCNC: 17 MG/DL (ref 7–18)
BUN/CREAT SERPL: 22 (ref 12–20)
CALCIUM SERPL-MCNC: 8.9 MG/DL (ref 8.5–10.1)
CHLORIDE SERPL-SCNC: 104 MMOL/L (ref 100–111)
CO2 SERPL-SCNC: 32 MMOL/L (ref 21–32)
CREAT SERPL-MCNC: 0.79 MG/DL (ref 0.6–1.3)
DIFFERENTIAL METHOD BLD: ABNORMAL
EOSINOPHIL # BLD: 0.16 K/UL (ref 0–0.4)
EOSINOPHIL NFR BLD: 2.7 % (ref 0–5)
ERYTHROCYTE [DISTWIDTH] IN BLOOD BY AUTOMATED COUNT: 21.7 % (ref 11.6–14.5)
EST. AVERAGE GLUCOSE BLD GHB EST-MCNC: 114 MG/DL
GLOBULIN SER CALC-MCNC: 3 G/DL (ref 2–4)
GLUCOSE SERPL-MCNC: 116 MG/DL (ref 74–99)
HBA1C MFR BLD: 5.6 % (ref 4.2–5.6)
HCT VFR BLD AUTO: 34.7 % (ref 35–45)
HGB BLD-MCNC: 10.5 G/DL (ref 12–16)
IMM GRANULOCYTES # BLD AUTO: 0.02 K/UL (ref 0–0.04)
IMM GRANULOCYTES NFR BLD AUTO: 0.3 % (ref 0–0.5)
INR PPP: 0.9 (ref 0.9–1.1)
LYMPHOCYTES # BLD: 0.79 K/UL (ref 0.9–3.3)
LYMPHOCYTES NFR BLD: 13.6 % (ref 21–52)
MCH RBC QN AUTO: 25.9 PG (ref 24–34)
MCHC RBC AUTO-ENTMCNC: 30.3 G/DL (ref 31–37)
MCV RBC AUTO: 85.7 FL (ref 78–100)
MONOCYTES # BLD: 0.81 K/UL (ref 0.05–1.2)
MONOCYTES NFR BLD: 13.9 % (ref 3–10)
NEUTS SEG # BLD: 3.97 K/UL (ref 1.8–8)
NEUTS SEG NFR BLD: 68.6 % (ref 40–73)
NRBC # BLD: 0 K/UL (ref 0–0.01)
NRBC BLD-RTO: 0 PER 100 WBC
PLATELET # BLD AUTO: 342 K/UL (ref 135–420)
PMV BLD AUTO: 10.3 FL (ref 9.2–11.8)
POTASSIUM SERPL-SCNC: 4.3 MMOL/L (ref 3.5–5.5)
PROT SERPL-MCNC: 6.3 G/DL (ref 6.4–8.2)
PROTHROMBIN TIME: 12.4 SEC (ref 11.9–14.9)
RBC # BLD AUTO: 4.05 M/UL (ref 4.2–5.3)
RBC MORPH BLD: ABNORMAL
RBC MORPH BLD: ABNORMAL
SODIUM SERPL-SCNC: 138 MMOL/L (ref 136–145)
WBC # BLD AUTO: 5.8 K/UL (ref 4.6–13.2)

## 2025-03-05 PROCEDURE — 85025 COMPLETE CBC W/AUTO DIFF WBC: CPT

## 2025-03-05 PROCEDURE — 93005 ELECTROCARDIOGRAM TRACING: CPT | Performed by: ORTHOPAEDIC SURGERY

## 2025-03-05 PROCEDURE — 80053 COMPREHEN METABOLIC PANEL: CPT

## 2025-03-05 PROCEDURE — 87088 URINE BACTERIA CULTURE: CPT

## 2025-03-05 PROCEDURE — 83036 HEMOGLOBIN GLYCOSYLATED A1C: CPT

## 2025-03-05 PROCEDURE — 85730 THROMBOPLASTIN TIME PARTIAL: CPT

## 2025-03-05 PROCEDURE — 85610 PROTHROMBIN TIME: CPT

## 2025-03-05 PROCEDURE — 87186 SC STD MICRODIL/AGAR DIL: CPT

## 2025-03-05 PROCEDURE — 87086 URINE CULTURE/COLONY COUNT: CPT

## 2025-03-05 NOTE — PERIOP NOTE
Patient denies diabetes and not on Lithium or Digoxin.    Patient reported she is here to testing for both upcoming surgeries with Dr. Weber and Dr. Laureano. Informed patient she will have to return for MRSA after her surgery with Dr. Weber for Dr. Laureano's procedure. Patient verbalized understanding.    MRSA, Urine (Culture), and Blood specimens sent to lab for: cbc w/diff, cmp, pt/ptt, and hga1c.  EKG done today.    Provided CHG kits for both procedures and reviewed instructions with patient.

## 2025-03-06 LAB
BACTERIA SPEC CULT: NORMAL
BACTERIA SPEC CULT: NORMAL
EKG ATRIAL RATE: 73 BPM
EKG DIAGNOSIS: NORMAL
EKG P AXIS: 45 DEGREES
EKG P-R INTERVAL: 180 MS
EKG Q-T INTERVAL: 454 MS
EKG QRS DURATION: 144 MS
EKG QTC CALCULATION (BAZETT): 500 MS
EKG R AXIS: 26 DEGREES
EKG T AXIS: 97 DEGREES
EKG VENTRICULAR RATE: 73 BPM
SERVICE CMNT-IMP: NORMAL

## 2025-03-08 LAB
BACTERIA SPEC CULT: ABNORMAL
CC UR VC: ABNORMAL
SERVICE CMNT-IMP: ABNORMAL

## 2025-03-11 ENCOUNTER — ANESTHESIA EVENT (OUTPATIENT)
Facility: HOSPITAL | Age: 85
End: 2025-03-11
Payer: MEDICARE

## 2025-03-17 ENCOUNTER — HOSPITAL ENCOUNTER (OUTPATIENT)
Facility: HOSPITAL | Age: 85
Setting detail: OUTPATIENT SURGERY
Discharge: HOME | End: 2025-03-17
Attending: ORTHOPAEDIC SURGERY | Admitting: ORTHOPAEDIC SURGERY
Payer: MEDICARE

## 2025-03-17 ENCOUNTER — ANESTHESIA (OUTPATIENT)
Facility: HOSPITAL | Age: 85
End: 2025-03-17
Payer: MEDICARE

## 2025-03-17 ENCOUNTER — APPOINTMENT (OUTPATIENT)
Facility: HOSPITAL | Age: 85
End: 2025-03-17
Attending: ORTHOPAEDIC SURGERY
Payer: MEDICARE

## 2025-03-17 VITALS
DIASTOLIC BLOOD PRESSURE: 86 MMHG | WEIGHT: 131 LBS | OXYGEN SATURATION: 94 % | HEART RATE: 65 BPM | TEMPERATURE: 97.4 F | SYSTOLIC BLOOD PRESSURE: 134 MMHG | RESPIRATION RATE: 16 BRPM | BODY MASS INDEX: 24.11 KG/M2 | HEIGHT: 62 IN

## 2025-03-17 DIAGNOSIS — G89.4 CHRONIC PAIN SYNDROME: Primary | ICD-10-CM

## 2025-03-17 PROCEDURE — 2500000003 HC RX 250 WO HCPCS: Performed by: ORTHOPAEDIC SURGERY

## 2025-03-17 PROCEDURE — 3600000002 HC SURGERY LEVEL 2 BASE: Performed by: ORTHOPAEDIC SURGERY

## 2025-03-17 PROCEDURE — 2500000003 HC RX 250 WO HCPCS: Performed by: REGISTERED NURSE

## 2025-03-17 PROCEDURE — 3600000012 HC SURGERY LEVEL 2 ADDTL 15MIN: Performed by: ORTHOPAEDIC SURGERY

## 2025-03-17 PROCEDURE — 7100000010 HC PHASE II RECOVERY - FIRST 15 MIN: Performed by: ORTHOPAEDIC SURGERY

## 2025-03-17 PROCEDURE — 2580000003 HC RX 258: Performed by: ORTHOPAEDIC SURGERY

## 2025-03-17 PROCEDURE — 7100000000 HC PACU RECOVERY - FIRST 15 MIN: Performed by: ORTHOPAEDIC SURGERY

## 2025-03-17 PROCEDURE — 7100000001 HC PACU RECOVERY - ADDTL 15 MIN: Performed by: ORTHOPAEDIC SURGERY

## 2025-03-17 PROCEDURE — 7100000011 HC PHASE II RECOVERY - ADDTL 15 MIN: Performed by: ORTHOPAEDIC SURGERY

## 2025-03-17 PROCEDURE — 2500000003 HC RX 250 WO HCPCS: Performed by: NURSE ANESTHETIST, CERTIFIED REGISTERED

## 2025-03-17 PROCEDURE — 6360000002 HC RX W HCPCS: Performed by: ANESTHESIOLOGY

## 2025-03-17 PROCEDURE — 2720000010 HC SURG SUPPLY STERILE: Performed by: ORTHOPAEDIC SURGERY

## 2025-03-17 PROCEDURE — 6360000002 HC RX W HCPCS: Performed by: ORTHOPAEDIC SURGERY

## 2025-03-17 PROCEDURE — 3700000000 HC ANESTHESIA ATTENDED CARE: Performed by: ORTHOPAEDIC SURGERY

## 2025-03-17 PROCEDURE — C1778 LEAD, NEUROSTIMULATOR: HCPCS | Performed by: ORTHOPAEDIC SURGERY

## 2025-03-17 PROCEDURE — 6360000002 HC RX W HCPCS: Performed by: REGISTERED NURSE

## 2025-03-17 PROCEDURE — 2709999900 HC NON-CHARGEABLE SUPPLY: Performed by: ORTHOPAEDIC SURGERY

## 2025-03-17 PROCEDURE — C1787 PATIENT PROGR, NEUROSTIM: HCPCS | Performed by: ORTHOPAEDIC SURGERY

## 2025-03-17 PROCEDURE — 77002 NEEDLE LOCALIZATION BY XRAY: CPT

## 2025-03-17 PROCEDURE — 3700000001 HC ADD 15 MINUTES (ANESTHESIA): Performed by: ORTHOPAEDIC SURGERY

## 2025-03-17 PROCEDURE — C1820 GENERATOR NEURO RECHG BAT SY: HCPCS | Performed by: ORTHOPAEDIC SURGERY

## 2025-03-17 DEVICE — 50CM 2X8 SURGICAL LEAD KIT
Type: IMPLANTABLE DEVICE | Site: SPINE THORACIC | Status: FUNCTIONAL
Brand: ARTISAN™

## 2025-03-17 DEVICE — IMPLANTABLE PULSE GENERATOR KIT
Type: IMPLANTABLE DEVICE | Site: SPINE THORACIC | Status: FUNCTIONAL
Brand: WAVEWRITER ALPHA™

## 2025-03-17 RX ORDER — FENTANYL CITRATE 50 UG/ML
INJECTION, SOLUTION INTRAMUSCULAR; INTRAVENOUS
Status: DISCONTINUED | OUTPATIENT
Start: 2025-03-17 | End: 2025-03-17 | Stop reason: SDUPTHER

## 2025-03-17 RX ORDER — SULFAMETHOXAZOLE AND TRIMETHOPRIM 800; 160 MG/1; MG/1
1 TABLET ORAL ONCE
COMMUNITY
Start: 2025-03-13

## 2025-03-17 RX ORDER — SODIUM CHLORIDE 9 MG/ML
INJECTION, SOLUTION INTRAVENOUS CONTINUOUS
Status: DISCONTINUED | OUTPATIENT
Start: 2025-03-17 | End: 2025-03-17 | Stop reason: HOSPADM

## 2025-03-17 RX ORDER — OXYCODONE HYDROCHLORIDE 5 MG/1
5 TABLET ORAL PRN
Status: DISCONTINUED | OUTPATIENT
Start: 2025-03-17 | End: 2025-03-17 | Stop reason: HOSPADM

## 2025-03-17 RX ORDER — DEXAMETHASONE SODIUM PHOSPHATE 4 MG/ML
INJECTION, SOLUTION INTRA-ARTICULAR; INTRALESIONAL; INTRAMUSCULAR; INTRAVENOUS; SOFT TISSUE
Status: DISCONTINUED | OUTPATIENT
Start: 2025-03-17 | End: 2025-03-17 | Stop reason: SDUPTHER

## 2025-03-17 RX ORDER — METOCLOPRAMIDE HYDROCHLORIDE 5 MG/ML
10 INJECTION INTRAMUSCULAR; INTRAVENOUS
Status: COMPLETED | OUTPATIENT
Start: 2025-03-17 | End: 2025-03-17

## 2025-03-17 RX ORDER — OXYCODONE HYDROCHLORIDE 5 MG/1
10 TABLET ORAL PRN
Status: DISCONTINUED | OUTPATIENT
Start: 2025-03-17 | End: 2025-03-17 | Stop reason: HOSPADM

## 2025-03-17 RX ORDER — GLYCOPYRROLATE 0.2 MG/ML
INJECTION INTRAMUSCULAR; INTRAVENOUS
Status: DISCONTINUED | OUTPATIENT
Start: 2025-03-17 | End: 2025-03-17 | Stop reason: SDUPTHER

## 2025-03-17 RX ORDER — SODIUM CHLORIDE 0.9 % (FLUSH) 0.9 %
5-40 SYRINGE (ML) INJECTION EVERY 12 HOURS SCHEDULED
Status: DISCONTINUED | OUTPATIENT
Start: 2025-03-17 | End: 2025-03-17 | Stop reason: HOSPADM

## 2025-03-17 RX ORDER — PROPOFOL 10 MG/ML
INJECTION, EMULSION INTRAVENOUS
Status: DISCONTINUED | OUTPATIENT
Start: 2025-03-17 | End: 2025-03-17 | Stop reason: SDUPTHER

## 2025-03-17 RX ORDER — CEPHALEXIN 500 MG/1
500 CAPSULE ORAL 4 TIMES DAILY
Qty: 28 CAPSULE | Refills: 0 | Status: SHIPPED | OUTPATIENT
Start: 2025-03-17 | End: 2025-03-24

## 2025-03-17 RX ORDER — ROCURONIUM BROMIDE 10 MG/ML
INJECTION, SOLUTION INTRAVENOUS
Status: DISCONTINUED | OUTPATIENT
Start: 2025-03-17 | End: 2025-03-17 | Stop reason: SDUPTHER

## 2025-03-17 RX ORDER — OXYCODONE AND ACETAMINOPHEN 5; 325 MG/1; MG/1
1 TABLET ORAL EVERY 4 HOURS PRN
Qty: 30 TABLET | Refills: 0 | Status: SHIPPED | OUTPATIENT
Start: 2025-03-17 | End: 2025-03-22

## 2025-03-17 RX ORDER — NALOXONE HYDROCHLORIDE 0.4 MG/ML
INJECTION, SOLUTION INTRAMUSCULAR; INTRAVENOUS; SUBCUTANEOUS PRN
Status: DISCONTINUED | OUTPATIENT
Start: 2025-03-17 | End: 2025-03-17 | Stop reason: HOSPADM

## 2025-03-17 RX ORDER — HYDROMORPHONE HYDROCHLORIDE 1 MG/ML
0.5 INJECTION, SOLUTION INTRAMUSCULAR; INTRAVENOUS; SUBCUTANEOUS ONCE
Status: DISCONTINUED | OUTPATIENT
Start: 2025-03-17 | End: 2025-03-17 | Stop reason: HOSPADM

## 2025-03-17 RX ORDER — IPRATROPIUM BROMIDE AND ALBUTEROL SULFATE 2.5; .5 MG/3ML; MG/3ML
1 SOLUTION RESPIRATORY (INHALATION)
Status: DISCONTINUED | OUTPATIENT
Start: 2025-03-17 | End: 2025-03-17 | Stop reason: HOSPADM

## 2025-03-17 RX ORDER — FENTANYL CITRATE 50 UG/ML
25 INJECTION, SOLUTION INTRAMUSCULAR; INTRAVENOUS EVERY 5 MIN PRN
Status: DISCONTINUED | OUTPATIENT
Start: 2025-03-17 | End: 2025-03-17 | Stop reason: HOSPADM

## 2025-03-17 RX ORDER — LIDOCAINE HYDROCHLORIDE 20 MG/ML
INJECTION, SOLUTION EPIDURAL; INFILTRATION; INTRACAUDAL; PERINEURAL
Status: DISCONTINUED | OUTPATIENT
Start: 2025-03-17 | End: 2025-03-17 | Stop reason: SDUPTHER

## 2025-03-17 RX ORDER — SODIUM CHLORIDE 0.9 % (FLUSH) 0.9 %
5-40 SYRINGE (ML) INJECTION PRN
Status: DISCONTINUED | OUTPATIENT
Start: 2025-03-17 | End: 2025-03-17 | Stop reason: HOSPADM

## 2025-03-17 RX ORDER — SODIUM CHLORIDE 9 MG/ML
INJECTION, SOLUTION INTRAVENOUS PRN
Status: DISCONTINUED | OUTPATIENT
Start: 2025-03-17 | End: 2025-03-17 | Stop reason: HOSPADM

## 2025-03-17 RX ORDER — HYDROMORPHONE HYDROCHLORIDE 1 MG/ML
0.5 INJECTION, SOLUTION INTRAMUSCULAR; INTRAVENOUS; SUBCUTANEOUS EVERY 5 MIN PRN
Status: COMPLETED | OUTPATIENT
Start: 2025-03-17 | End: 2025-03-17

## 2025-03-17 RX ORDER — ONDANSETRON 2 MG/ML
INJECTION INTRAMUSCULAR; INTRAVENOUS
Status: DISCONTINUED | OUTPATIENT
Start: 2025-03-17 | End: 2025-03-17 | Stop reason: SDUPTHER

## 2025-03-17 RX ADMIN — GLYCOPYRROLATE 0.2 MG: 0.2 INJECTION INTRAMUSCULAR; INTRAVENOUS at 14:20

## 2025-03-17 RX ADMIN — FENTANYL CITRATE 25 MCG: 50 INJECTION, SOLUTION INTRAMUSCULAR; INTRAVENOUS at 14:52

## 2025-03-17 RX ADMIN — ROCURONIUM BROMIDE 30 MG: 10 INJECTION, SOLUTION INTRAVENOUS at 14:23

## 2025-03-17 RX ADMIN — Medication 2000 MG: at 14:31

## 2025-03-17 RX ADMIN — SUGAMMADEX 250 MG: 100 INJECTION, SOLUTION INTRAVENOUS at 15:37

## 2025-03-17 RX ADMIN — LIDOCAINE HYDROCHLORIDE 40 MG: 20 INJECTION, SOLUTION EPIDURAL; INFILTRATION; INTRACAUDAL; PERINEURAL at 15:23

## 2025-03-17 RX ADMIN — FENTANYL CITRATE 25 MCG: 50 INJECTION, SOLUTION INTRAMUSCULAR; INTRAVENOUS at 14:20

## 2025-03-17 RX ADMIN — LIDOCAINE HYDROCHLORIDE 60 MG: 20 INJECTION, SOLUTION EPIDURAL; INFILTRATION; INTRACAUDAL; PERINEURAL at 14:20

## 2025-03-17 RX ADMIN — PROPOFOL 120 MG: 10 INJECTION, EMULSION INTRAVENOUS at 14:23

## 2025-03-17 RX ADMIN — METOCLOPRAMIDE HYDROCHLORIDE 10 MG: 5 INJECTION, SOLUTION INTRAMUSCULAR; INTRAVENOUS at 16:50

## 2025-03-17 RX ADMIN — HYDROMORPHONE HYDROCHLORIDE 0.5 MG: 1 INJECTION, SOLUTION INTRAMUSCULAR; INTRAVENOUS; SUBCUTANEOUS at 16:16

## 2025-03-17 RX ADMIN — HYDROMORPHONE HYDROCHLORIDE 0.5 MG: 1 INJECTION, SOLUTION INTRAMUSCULAR; INTRAVENOUS; SUBCUTANEOUS at 16:21

## 2025-03-17 RX ADMIN — FENTANYL CITRATE 25 MCG: 50 INJECTION, SOLUTION INTRAMUSCULAR; INTRAVENOUS at 15:45

## 2025-03-17 RX ADMIN — DEXAMETHASONE SODIUM PHOSPHATE 4 MG: 4 INJECTION, SOLUTION INTRAMUSCULAR; INTRAVENOUS at 14:36

## 2025-03-17 RX ADMIN — SODIUM CHLORIDE: 0.9 INJECTION, SOLUTION INTRAVENOUS at 11:17

## 2025-03-17 RX ADMIN — FENTANYL CITRATE 25 MCG: 50 INJECTION, SOLUTION INTRAMUSCULAR; INTRAVENOUS at 14:38

## 2025-03-17 RX ADMIN — ONDANSETRON HYDROCHLORIDE 4 MG: 2 INJECTION INTRAMUSCULAR; INTRAVENOUS at 14:36

## 2025-03-17 ASSESSMENT — PAIN SCALES - GENERAL
PAINLEVEL_OUTOF10: 8
PAINLEVEL_OUTOF10: 5
PAINLEVEL_OUTOF10: 8
PAINLEVEL_OUTOF10: 5
PAINLEVEL_OUTOF10: 5

## 2025-03-17 ASSESSMENT — PAIN DESCRIPTION - DESCRIPTORS
DESCRIPTORS: ACHING
DESCRIPTORS: ACHING;SHOOTING

## 2025-03-17 ASSESSMENT — PAIN DESCRIPTION - PAIN TYPE
TYPE: SURGICAL PAIN

## 2025-03-17 ASSESSMENT — PAIN DESCRIPTION - FREQUENCY
FREQUENCY: INTERMITTENT

## 2025-03-17 ASSESSMENT — LIFESTYLE VARIABLES: SMOKING_STATUS: 0

## 2025-03-17 ASSESSMENT — PAIN DESCRIPTION - ONSET
ONSET: GRADUAL
ONSET: GRADUAL
ONSET: SUDDEN
ONSET: SUDDEN
ONSET: GRADUAL

## 2025-03-17 ASSESSMENT — PAIN - FUNCTIONAL ASSESSMENT
PAIN_FUNCTIONAL_ASSESSMENT: ACTIVITIES ARE NOT PREVENTED
PAIN_FUNCTIONAL_ASSESSMENT: ACTIVITIES ARE NOT PREVENTED
PAIN_FUNCTIONAL_ASSESSMENT: 0-10
PAIN_FUNCTIONAL_ASSESSMENT: ACTIVITIES ARE NOT PREVENTED
PAIN_FUNCTIONAL_ASSESSMENT: ACTIVITIES ARE NOT PREVENTED

## 2025-03-17 ASSESSMENT — PAIN DESCRIPTION - ORIENTATION
ORIENTATION: RIGHT
ORIENTATION: RIGHT
ORIENTATION: ANTERIOR;RIGHT
ORIENTATION: RIGHT
ORIENTATION: ANTERIOR;RIGHT

## 2025-03-17 ASSESSMENT — PAIN DESCRIPTION - LOCATION
LOCATION: ABDOMEN;HIP
LOCATION: ABDOMEN;HIP
LOCATION: HIP;ABDOMEN
LOCATION: ABDOMEN;HIP
LOCATION: HIP

## 2025-03-17 NOTE — OP NOTE
OPERATIVE NOTE    Patient: Angeline Rogers MRN: [id number]  SSN: [id number]    YOB: 1940  Age: 85 y.o.  Sex: female      Indications: This is a 85 y.o. year-old female who presents with chronic back pain. She was positive for relief from a temporary spinal cord stimulator. The patient was admitted for surgery as conservative measures have failed.    Date of Procedure: 3/17/2025     Preoperative Diagnosis: Other chronic pain [G89.29]    Postoperative Diagnosis: * No post-op diagnosis entered *      Procedure: Procedure(s):  THORACIC 9 LAMINOTOMY, LAMINECTOMY WITH PLACEMENT OF PERMANENT SPINAL CORD STIMULATOR PADDLE AND PULSE GENERATOR BATTERY- [employer] WITH C-ARM     Surgeon: Saeed Weber DO, and Surgical Assistant: Julia James NP    Assistant: Circulator: Vikki Crenshaw RN  Nurse Practitioner: Julia James APRN - NP  Surgical Assistant: Saeed Jones  Radiology Technologist: Luci Rodriguez Circulator: Lizette Lloyd RN  Relief Scrub: Amy Ballesteros  Scrub Person First: Page Fuller  Scrub Person Second: Terrell Samuel RN  Physician Assistant: Jennifer Weber PA-C    Anesthesia: GETA    Estimated Blood Loss: @25cc    Specimens: * No specimens in log *     Drains: none    Implants:   Implant Name Type Inv. Item Serial No.  Lot No. LRB No. Used Action   IMPL SPINE STIM LEAD ARTISAN 50CM - [id number]  IMPL SPINE STIM LEAD ARTISAN 50CM [id number] [employer]-  N/A 1 Implanted   STIMULATOR WAVEWRITER ALPHA IMPL PULSE GENRTR KIT - [id number]  STIMULATOR WAVEWRITER ALPHA IMPL PULSE GENRTR KIT [id number] [employer]-WD [id number] N/A 1 Implanted       Complications: None; patient tolerated the procedure well.    Procedure: The patient was greeted by anesthesia and taken to the operative suite, where the patient underwent general endotracheal anesthesia.  The patient was positioned in the prone position on a standard radiolucent Otto spine  skin sealant was again placed as well as a layer of dressing and tape.  My Physician Assistant/Nurse Practitioner was utilized as a co-surgeon for this case to include vascular retraction, suction, gluteal pocket preparation, electrode passage, irrigation and final closure of surgical incisions.  This assistance was instrumental to the safety and success of this surgical case.The patient recovered from anesthesia and was transferred to the postanesthesia care unit in stable condition.  Final x-rays confirmed excellent position of the spinal cord stimulator, centered and left over the thoracic 7 - 8 vertebrae as planned.

## 2025-03-17 NOTE — PERIOP NOTE
Intake/Output Summary (Last 24 hours) at 3/17/2025 1709  Last data filed at 3/17/2025 1642  Gross per 24 hour   Intake 1300 ml   Output 25 ml   Net 1275 ml

## 2025-03-17 NOTE — H&P
History and Physical        Patient: Angeline Rogers               Sex: female          DOA: 3/17/2025         YOB: 1940      Age:  85 y.o.        LOS:  LOS: 0 days        HPI:     Angeline Rogers is a 85 y.o. female who has a history of back pain.  Their pain is typically across the lower back and is chronic in nature.  She denies numbness/tingling in the same distribution of their pain. She denies weakness in the bilateral lower extremity.  Their pain is worse with ambulation and better at rest. She has failed conservative care including anti-inflammatories, analgesics, physical therapy and injections.  Their pain affects their activities of daily living and would like to move forward with surgical intervention.       Past Medical History:   Diagnosis Date    Anxiety 2023    treated with meds, managed by PCP    Awareness under anesthesia 12/18/2017    pt states she woke up during shoulder surgery, didn't have any issues with surgery since then    Benign essential tremor 2021    chin    DDD (degenerative disc disease), lumbar     scheduled for spinal cord stimulator placement 3/17/2025    Exercise tolerance finding 03/11/2025    able to climb one flight of stairs without stopping for cp/sob    GERD (gastroesophageal reflux disease) 1995    on meds, managed by PCP    Hard of hearing 2020    Hyperlipidemia 2005    treated with meds, managed by PCP    Hypertension 2005    treated with meds, managed by PCP, Samantha Bradley, [place]    LBBB (left bundle branch block) 2017    hasn't seen cardiologist in two years, Dr. NAFISA Carroll with [facility]    Lichen planus 1967    on Dupixent, managed by dermatogist Dr. King    Macular degeneration     managed by opthalmalogist    PONV (postoperative nausea and vomiting)     has had post op nausea after several surgeries    PUD (peptic ulcer disease) 2015    H/O, no current treatment    SIRS (systemic inflammatory response syndrome) (HCC) 2018    pt is unaware of  dx.    Wears hearing aid in both ears     Wears prescription eyeglasses        Past Surgical History:   Procedure Laterality Date    BACK SURGERY  10/2018    BONE GRAFT  1965    right femur    BUNIONECTOMY Bilateral 1990    CARPAL TUNNEL RELEASE Left 2008    CATARACT REMOVAL Bilateral 2019    CERVICAL SPINE SURGERY  2008    COLONOSCOPY  11/2019    FRACTURE SURGERY Right 1964    femur    HIP ARTHROPLASTY Right 1996    revision in 2014    KNEE ARTHROPLASTY Right 2004    ORTHOPEDIC SURGERY  1967    SHOULDER SURGERY Right 2017    SINUS SURGERY  2010    polyp removal       No family history on file.    Social History     Socioeconomic History    Marital status:      Spouse name: None    Number of children: None    Years of education: None    Highest education level: None   Tobacco Use    Smoking status: Never    Smokeless tobacco: Never   Vaping Use    Vaping status: Never Used   Substance and Sexual Activity    Alcohol use: Not Currently    Drug use: Never    Sexual activity: Defer     Social Drivers of Health     Financial Resource Strain: Low Risk  (3/15/2025)    Received from [facility]    Overall Financial Resource Strain (CARDIA)     Difficulty of Paying Living Expenses: Not hard at all   Food Insecurity: No Food Insecurity (3/15/2025)    Received from [facility]    Hunger Vital Sign     Worried About Running Out of Food in the Last Year: Never true     Ran Out of Food in the Last Year: Never true   Transportation Needs: No Transportation Needs (3/15/2025)    Received from [facility]    PRAPARE - Transportation     Lack of Transportation (Medical): No     Lack of Transportation (Non-Medical): No   Physical Activity: Insufficiently Active (2/14/2023)    Received from [facility]    Exercise Vital Sign     Days of Exercise per Week: 2 days     Minutes of Exercise per Session: 10 min   Stress: No Stress Concern Present (2/14/2023)    Received from [place]

## 2025-03-17 NOTE — ANESTHESIA PRE PROCEDURE
Department of Anesthesiology  Preprocedure Note       Name:  Angeline Rogers   Age:  85 y.o.  :  1940                                          MRN:  [id number]         Date:  3/17/2025      Surgeon: Surgeon(s):  Saeed Weber MD    Procedure: Procedure(s):  THORACIC 9 LAMINOTOMY, LAMINECTOMY WITH PLACEMENT OF PERMANENT SPINAL CORD STIMULATOR PADDLE AND PULSE GENERATOR BATTERY- [employer] WITH C-ARM \"SPEC POP\"    Medications prior to admission:   Prior to Admission medications    Medication Sig Start Date End Date Taking? Authorizing Provider   sulfamethoxazole-trimethoprim (BACTRIM DS;SEPTRA DS) 800-160 MG per tablet Take 1 tablet by mouth once 3/13/25  Yes Jessica Chacon MD   lisinopril (PRINIVIL;ZESTRIL) 2.5 MG tablet Take 1 tablet by mouth every morning Indications: HTN   Yes Jessica Chacon MD   omeprazole (PRILOSEC) 40 MG delayed release capsule Take 1 capsule by mouth in the morning and at bedtime Indications: GERD   Yes Jessica Chacon MD   simvastatin (ZOCOR) 20 MG tablet Take 1 tablet by mouth nightly Indications: cholestrol   Yes ProviderJessica MD   MAGNESIUM CITRATE PO Take 800 mg by mouth every evening   Yes Jessica Chacon MD   citalopram (CELEXA) 20 MG tablet Take 1 tablet by mouth at bedtime Indications: anxiety   Yes Jessica Chacon MD   traZODone (DESYREL) 50 MG tablet Take 1 tablet by mouth nightly Indications: sleep   Yes Jessica Chacon MD   gabapentin (NEURONTIN) 400 MG tablet Take 1 tablet by mouth 3 times daily. Indications: pain   Yes Jessica Chacon MD   Calcium Citrate-Vitamin D (CALCIUM CITRATE + D PO) Take 2 tablets by mouth daily   Yes Jessica Chacon MD   ferrous sulfate 28 MG TABS Take 28 mg by mouth daily Indications: anemia   Yes Jessica Chacon MD   Multiple Vitamins-Minerals (EYE VITAMINS PO) Take 1 tablet by mouth daily   Yes Jessica Chacon MD   dupilumab (DUPIXENT) 300 MG/2ML SOAJ

## 2025-03-17 NOTE — PROGRESS NOTES
TRANSFER - IN REPORT:    Verbal report received from OR RN and CRNA  on Angeline Rogers  being received from OR  for routine progression of patient care      Report consisted of patient's Situation, Background, Assessment and   Recommendations(SBAR).     Information from the following report(s) Surgery Report, Intake/Output, MAR, and Recent Results was reviewed with the receiving nurse.    Opportunity for questions and clarification was provided.      Assessment completed upon patient's arrival to unit and care assumed.

## 2025-03-17 NOTE — DISCHARGE INSTRUCTIONS
[facility]  Dr. Weber’s Post-Operative Instructions for Spinal Cord Stimulator placement    Diet:  1. Begin with liquids and light foods such as Jell-O and soups.  2. Advance as tolerated to your regular diet if not nauseated.    First 24 hours:  1. Be in the care of a responsible adult.  2. Do not drive or operate machinery.  3. Do not drink alcoholic beverages.    Wound Care:  1. Maintain your postoperative dressing for 72 hours then remove pad and replace with gauze.  Then change dressing daily.   2.  A home health nurse will come to your house to help you with dressing changes.  2. Keep the surgical incisions dry until follow-up.    Medications:  1. Strong oral narcotic pain medications have been prescribed for the first few days. Use only as directed. No pain medication is capable of taking away all the pain. Taking your pills at regular intervals will give you the best chance of having less pain.  2. If you need a refill PLEASE PLAN AHEAD. Call our office during regular hours (8-5).  3. Do not combine with alcoholic beverages.  4. Be careful as you walk, climb stairs or drive as mild dizziness is not unusual.  5. Do not take medications that have not been prescribed by your surgeon.  6. You may switch to over the counter pain medication of your choice as you become more comfortable.    WHEN TO CALL YOUR SURGEON:  1. Unrelenting pain  2. Fever or Chills  3. Redness around incisions  4. Color change in foot or toes  5. Continuous drainage or bleeding from wounds (a small amount of drainage is expected)  6. Any other worrisome condition    WHEN TO CALL YOUR REGULAR DOCTOR:  1. Flare up of any of your regular medical conditions    WHEN TO CALL 911:  1. Chest Pain  2. Shortness of Breath  3. Any other acute serious condition    CALL THE OFFICE:  If you have severe pain unrelieved by the medications;  If you have a fever of 101.0°F or greater;   If you notice excessive swelling, redness, or persistent drainage from the

## 2025-03-17 NOTE — INTERVAL H&P NOTE
Update History & Physical    The patient's History and Physical of March 17, chart was reviewed with the patient and I examined the patient. There was no change. The surgical site was confirmed by the patient and me.     Plan: The risks, benefits, expected outcome, and alternative to the recommended procedure have been discussed with the patient. Patient understands and wants to proceed with the procedure.     Electronically signed by Saeed Mesa MD on 3/17/2025 at 1:41 PM

## 2025-03-18 NOTE — ANESTHESIA POSTPROCEDURE EVALUATION
Department of Anesthesiology  Postprocedure Note    Patient: Angeline Rogers  MRN: [id number]  YOB: 1940  Date of evaluation: 3/18/2025    Procedure Summary       Date: 03/17/25 Room / Location: [facility] MAIN 08 / [facility] MAIN OR    Anesthesia Start: 1418 Anesthesia Stop: 1554    Procedure: THORACIC 9 LAMINOTOMY, LAMINECTOMY WITH PLACEMENT OF PERMANENT SPINAL CORD STIMULATOR PADDLE AND PULSE GENERATOR BATTERY- [employer] WITH C-ARM \"SPEC POP\" (Spine Thoracic) Diagnosis:       Other chronic pain      (Other chronic pain [G89.29])    Surgeons: Saeed Weber MD Responsible Provider: Tej Shepard MD    Anesthesia Type: General ASA Status: 2            Anesthesia Type: General    Jesús Phase I: Jesús Score: 9    Jesús Phase II: Jesús Score: 10    Anesthesia Post Evaluation    Patient location during evaluation: PACU  Patient participation: complete - patient participated  Level of consciousness: awake and alert  Airway patency: patent  Nausea & Vomiting: no nausea and no vomiting  Cardiovascular status: blood pressure returned to baseline  Respiratory status: acceptable  Hydration status: euvolemic  Multimodal analgesia pain management approach  Pain management: adequate        No notable events documented.

## (undated) DEVICE — SUTURE ETHIBOND EXCEL SZ 1 L30IN NONABSORBABLE GRN L22MM OS-4

## (undated) DEVICE — WILSON FRAME STYLE POSITIONING KITS - 	FRAME PAD SLEEVES (SET OF 2) , DRAPE PROTECTOR, BAR PROTECTOR 7" COMFORT FOAM HEADREST, LAMINECTOMY ARM CRADLES: Brand: SOULE MEDICAL

## (undated) DEVICE — STERILE POLYISOPRENE POWDER-FREE SURGICAL GLOVES: Brand: PROTEXIS

## (undated) DEVICE — SUTURE VICRYL + SZ 2-0 L18IN ABSRB VLT CT-2 1/2 CIR TAPERCUT VCP726D

## (undated) DEVICE — SHEET,DRAPE,70X100,STERILE: Brand: MEDLINE

## (undated) DEVICE — GLOVE SURG SZ 65 CRM LTX FREE POLYISOPRENE POLYMER BEAD ANTI

## (undated) DEVICE — Device

## (undated) DEVICE — REMOTE CONTROL KIT: Brand: FREELINK™

## (undated) DEVICE — CATHETER IV 14GA L1.75IN OD2.146MM ID1.740MM ORNG VIALON

## (undated) DEVICE — SYRINGE BLB 50CC IRRIG PLIABLE FNGR FLNG GRAD FLSK DISP

## (undated) DEVICE — SUTURE VICRYL + SZ 1 L18IN ABSRB UD L36MM CT-1 1/2 CIR VCP841D

## (undated) DEVICE — REM POLYHESIVE ADULT PATIENT RETURN ELECTRODE: Brand: VALLEYLAB

## (undated) DEVICE — AGENT HEMSTAT 1GM PORCINE GEL ABSRB PWD FOR CONT OOZING

## (undated) DEVICE — DRAIN KT WND 10FR RND 400ML --

## (undated) DEVICE — SUTURE VCRL + SZ 2-0 L18IN ABSRB VLT CT-2 1/2 CIR TAPERCUT VCP726D

## (undated) DEVICE — KENDALL SCD EXPRESS SLEEVES, KNEE LENGTH, MEDIUM: Brand: KENDALL SCD

## (undated) DEVICE — GLOVE SURG SZ 65 L12IN FNGR THK79MIL GRN LTX FREE

## (undated) DEVICE — HANDPIECE SET WITH HIGH FLOW TIP AND SUCTION TUBE: Brand: INTERPULSE

## (undated) DEVICE — APPLICATOR MEDICATED 26 CC SOLUTION HI LT ORNG CHLORAPREP

## (undated) DEVICE — GARMENT,MEDLINE,DVT,INT,CALF,MED, GEN2: Brand: MEDLINE

## (undated) DEVICE — ROUND DISSECTORS: Brand: DEROYAL

## (undated) DEVICE — SOL IRRIGATION INJ NACL 0.9% 500ML BTL

## (undated) DEVICE — PASSING ELEVATOR: Brand: PRECISION ™

## (undated) DEVICE — GLOVE SURG SZ 9 THK91MIL LTX FREE SYN POLYISOPRENE ANTI

## (undated) DEVICE — SOLUTION IRRIG 500ML 0.9% SOD CHLO USP POUR PLAS BTL

## (undated) DEVICE — 3.0MM PRECISION NEURO (MATCH HEAD)

## (undated) DEVICE — SUTURE STRATAFIX SZ 3-0 L30CM NONABSORBABLE UD L19MM PS-2 SXMP2B408

## (undated) DEVICE — ELECTRODE PT RET AD L9FT HI MOIST COND ADH HYDRGEL CORDED

## (undated) DEVICE — TRAY CATH 16FR DRN BG LF -- CONVERT TO ITEM 363158

## (undated) DEVICE — X-RAY SPONGES,12 PLY: Brand: DERMACEA

## (undated) DEVICE — Z DISCONTINUED USE (MFG CAT 7984-37) SOLUTION IV SODIUM CHL 0.9% 100 ML INJ

## (undated) DEVICE — 3M™ IOBAN™ 2 ANTIMICROBIAL INCISE DRAPE 6650EZ: Brand: IOBAN™ 2

## (undated) DEVICE — ABDOMINAL PAD: Brand: DERMACEA

## (undated) DEVICE — SYRINGE MED 30ML STD CLR PLAS LUERLOCK TIP N CTRL DISP

## (undated) DEVICE — HANDPIECE SET WITH FAN SPRAY TIP: Brand: INTERPULSE

## (undated) DEVICE — DRESSING MEPILEX BORDER FLEX LITE 10X10CM

## (undated) DEVICE — PREP SKN PREVAIL 40ML APPL --

## (undated) DEVICE — 35CM LONG TUNNELING TOOL

## (undated) DEVICE — CHARGING SYSTEM KIT: Brand: PRECISION™

## (undated) DEVICE — SUTURE ABSORBABLE BRAIDED 1-0 OS-8 CR 3X18 IN UD VICRYL J757T

## (undated) DEVICE — GLOVE SURG SZ 7 L12IN FNGR THK79MIL GRN LTX FREE

## (undated) DEVICE — SOLUTION IV 1000ML LAC RINGERS PH 6.5 INJ USP VIAFLX PLAS

## (undated) DEVICE — NON-ADHERENT STRIPS,OIL EMULSION: Brand: CURITY

## (undated) DEVICE — LIQUIBAND RAPID ADHESIVE 36/CS 0.8ML: Brand: MEDLINE

## (undated) DEVICE — PACK PROCEDURE SURG LAMINECTOMY SPINE CUST